# Patient Record
Sex: FEMALE | Race: BLACK OR AFRICAN AMERICAN | NOT HISPANIC OR LATINO | URBAN - METROPOLITAN AREA
[De-identification: names, ages, dates, MRNs, and addresses within clinical notes are randomized per-mention and may not be internally consistent; named-entity substitution may affect disease eponyms.]

---

## 2017-09-24 ENCOUNTER — EMERGENCY (EMERGENCY)
Facility: HOSPITAL | Age: 77
LOS: 0 days | Discharge: HOME | End: 2017-09-24

## 2017-09-24 DIAGNOSIS — T16.2XXA FOREIGN BODY IN LEFT EAR, INITIAL ENCOUNTER: ICD-10-CM

## 2017-09-24 DIAGNOSIS — I10 ESSENTIAL (PRIMARY) HYPERTENSION: ICD-10-CM
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
## 2017-09-26 PROBLEM — Z00.00 ENCOUNTER FOR PREVENTIVE HEALTH EXAMINATION: Status: ACTIVE | Noted: 2017-09-26

## 2017-10-16 ENCOUNTER — APPOINTMENT (OUTPATIENT)
Dept: OTOLARYNGOLOGY | Facility: CLINIC | Age: 77
End: 2017-10-16

## 2017-11-09 ENCOUNTER — APPOINTMENT (OUTPATIENT)
Dept: OTOLARYNGOLOGY | Facility: CLINIC | Age: 77
End: 2017-11-09

## 2017-11-17 ENCOUNTER — APPOINTMENT (OUTPATIENT)
Dept: OTOLARYNGOLOGY | Facility: CLINIC | Age: 77
End: 2017-11-17

## 2018-02-02 ENCOUNTER — APPOINTMENT (OUTPATIENT)
Dept: OTOLARYNGOLOGY | Facility: CLINIC | Age: 78
End: 2018-02-02

## 2018-05-30 ENCOUNTER — APPOINTMENT (OUTPATIENT)
Dept: UROLOGY | Facility: CLINIC | Age: 78
End: 2018-05-30
Payer: MEDICARE

## 2018-05-30 VITALS
HEIGHT: 61 IN | SYSTOLIC BLOOD PRESSURE: 128 MMHG | WEIGHT: 148 LBS | BODY MASS INDEX: 27.94 KG/M2 | HEART RATE: 65 BPM | DIASTOLIC BLOOD PRESSURE: 71 MMHG

## 2018-05-30 DIAGNOSIS — Z87.891 PERSONAL HISTORY OF NICOTINE DEPENDENCE: ICD-10-CM

## 2018-05-30 DIAGNOSIS — R31.29 OTHER MICROSCOPIC HEMATURIA: ICD-10-CM

## 2018-05-30 DIAGNOSIS — I10 ESSENTIAL (PRIMARY) HYPERTENSION: ICD-10-CM

## 2018-05-30 DIAGNOSIS — Z78.9 OTHER SPECIFIED HEALTH STATUS: ICD-10-CM

## 2018-05-30 DIAGNOSIS — E78.00 PURE HYPERCHOLESTEROLEMIA, UNSPECIFIED: ICD-10-CM

## 2018-05-30 DIAGNOSIS — R31.9 HEMATURIA, UNSPECIFIED: ICD-10-CM

## 2018-05-30 PROCEDURE — 99203 OFFICE O/P NEW LOW 30 MIN: CPT

## 2018-05-30 RX ORDER — AMLODIPINE BESYLATE 5 MG/1
5 TABLET ORAL
Qty: 90 | Refills: 0 | Status: ACTIVE | COMMUNITY
Start: 2018-01-22

## 2018-12-05 ENCOUNTER — APPOINTMENT (OUTPATIENT)
Dept: UROLOGY | Facility: CLINIC | Age: 78
End: 2018-12-05

## 2019-12-09 ENCOUNTER — RESULT REVIEW (OUTPATIENT)
Age: 79
End: 2019-12-09

## 2020-09-30 ENCOUNTER — INPATIENT (INPATIENT)
Facility: HOSPITAL | Age: 80
LOS: 2 days | Discharge: HOME | End: 2020-10-03
Attending: HOSPITALIST | Admitting: HOSPITALIST
Payer: MEDICARE

## 2020-09-30 VITALS
DIASTOLIC BLOOD PRESSURE: 79 MMHG | OXYGEN SATURATION: 98 % | SYSTOLIC BLOOD PRESSURE: 133 MMHG | WEIGHT: 149.91 LBS | TEMPERATURE: 98 F | HEART RATE: 82 BPM | RESPIRATION RATE: 19 BRPM

## 2020-09-30 DIAGNOSIS — E78.00 PURE HYPERCHOLESTEROLEMIA, UNSPECIFIED: ICD-10-CM

## 2020-09-30 DIAGNOSIS — I10 ESSENTIAL (PRIMARY) HYPERTENSION: ICD-10-CM

## 2020-09-30 DIAGNOSIS — G45.9 TRANSIENT CEREBRAL ISCHEMIC ATTACK, UNSPECIFIED: ICD-10-CM

## 2020-09-30 LAB
ALBUMIN SERPL ELPH-MCNC: 4.2 G/DL — SIGNIFICANT CHANGE UP (ref 3.5–5.2)
ALP SERPL-CCNC: 72 U/L — SIGNIFICANT CHANGE UP (ref 30–115)
ALT FLD-CCNC: 19 U/L — SIGNIFICANT CHANGE UP (ref 0–41)
ANION GAP SERPL CALC-SCNC: 11 MMOL/L — SIGNIFICANT CHANGE UP (ref 7–14)
APTT BLD: 30.7 SEC — SIGNIFICANT CHANGE UP (ref 27–39.2)
AST SERPL-CCNC: 40 U/L — SIGNIFICANT CHANGE UP (ref 0–41)
BASE EXCESS BLDV CALC-SCNC: -1.4 MMOL/L — SIGNIFICANT CHANGE UP (ref -2–2)
BASOPHILS # BLD AUTO: 0.05 K/UL — SIGNIFICANT CHANGE UP (ref 0–0.2)
BASOPHILS NFR BLD AUTO: 0.4 % — SIGNIFICANT CHANGE UP (ref 0–1)
BILIRUB SERPL-MCNC: <0.2 MG/DL — SIGNIFICANT CHANGE UP (ref 0.2–1.2)
BUN SERPL-MCNC: 34 MG/DL — HIGH (ref 10–20)
CA-I SERPL-SCNC: 1.29 MMOL/L — SIGNIFICANT CHANGE UP (ref 1.12–1.3)
CALCIUM SERPL-MCNC: 10.4 MG/DL — HIGH (ref 8.5–10.1)
CHLORIDE SERPL-SCNC: 102 MMOL/L — SIGNIFICANT CHANGE UP (ref 98–110)
CO2 SERPL-SCNC: 22 MMOL/L — SIGNIFICANT CHANGE UP (ref 17–32)
CREAT SERPL-MCNC: 1.9 MG/DL — HIGH (ref 0.7–1.5)
EOSINOPHIL # BLD AUTO: 0.11 K/UL — SIGNIFICANT CHANGE UP (ref 0–0.7)
EOSINOPHIL NFR BLD AUTO: 0.9 % — SIGNIFICANT CHANGE UP (ref 0–8)
ERYTHROCYTE [SEDIMENTATION RATE] IN BLOOD: 38 MM/HR — HIGH (ref 0–20)
GAS PNL BLDV: 138 MMOL/L — SIGNIFICANT CHANGE UP (ref 136–145)
GAS PNL BLDV: SIGNIFICANT CHANGE UP
GLUCOSE SERPL-MCNC: 111 MG/DL — HIGH (ref 70–99)
HCO3 BLDV-SCNC: 24 MMOL/L — SIGNIFICANT CHANGE UP (ref 22–29)
HCT VFR BLD CALC: 35.7 % — LOW (ref 37–47)
HCT VFR BLDA CALC: 36 % — SIGNIFICANT CHANGE UP (ref 34–44)
HGB BLD CALC-MCNC: 11.7 G/DL — LOW (ref 14–18)
HGB BLD-MCNC: 11.4 G/DL — LOW (ref 12–16)
HOROWITZ INDEX BLDV+IHG-RTO: 21 — SIGNIFICANT CHANGE UP
IMM GRANULOCYTES NFR BLD AUTO: 0.2 % — SIGNIFICANT CHANGE UP (ref 0.1–0.3)
INR BLD: 1.05 RATIO — SIGNIFICANT CHANGE UP (ref 0.65–1.3)
LACTATE BLDV-MCNC: 0.7 MMOL/L — SIGNIFICANT CHANGE UP (ref 0.5–1.6)
LYMPHOCYTES # BLD AUTO: 3.77 K/UL — HIGH (ref 1.2–3.4)
LYMPHOCYTES # BLD AUTO: 30.6 % — SIGNIFICANT CHANGE UP (ref 20.5–51.1)
MCHC RBC-ENTMCNC: 26.5 PG — LOW (ref 27–31)
MCHC RBC-ENTMCNC: 31.9 G/DL — LOW (ref 32–37)
MCV RBC AUTO: 83 FL — SIGNIFICANT CHANGE UP (ref 81–99)
MONOCYTES # BLD AUTO: 0.94 K/UL — HIGH (ref 0.1–0.6)
MONOCYTES NFR BLD AUTO: 7.6 % — SIGNIFICANT CHANGE UP (ref 1.7–9.3)
NEUTROPHILS # BLD AUTO: 7.42 K/UL — HIGH (ref 1.4–6.5)
NEUTROPHILS NFR BLD AUTO: 60.3 % — SIGNIFICANT CHANGE UP (ref 42.2–75.2)
NRBC # BLD: 0 /100 WBCS — SIGNIFICANT CHANGE UP (ref 0–0)
PCO2 BLDV: 40 MMHG — LOW (ref 41–51)
PH BLDV: 7.38 — SIGNIFICANT CHANGE UP (ref 7.26–7.43)
PLATELET # BLD AUTO: 346 K/UL — SIGNIFICANT CHANGE UP (ref 130–400)
PO2 BLDV: 30 MMHG — SIGNIFICANT CHANGE UP (ref 20–40)
POTASSIUM BLDV-SCNC: 4.4 MMOL/L — SIGNIFICANT CHANGE UP (ref 3.3–5.6)
POTASSIUM SERPL-MCNC: 5.6 MMOL/L — HIGH (ref 3.5–5)
POTASSIUM SERPL-SCNC: 5.6 MMOL/L — HIGH (ref 3.5–5)
PROT SERPL-MCNC: 7.8 G/DL — SIGNIFICANT CHANGE UP (ref 6–8)
PROTHROM AB SERPL-ACNC: 12.1 SEC — SIGNIFICANT CHANGE UP (ref 9.95–12.87)
RAPID RVP RESULT: SIGNIFICANT CHANGE UP
RBC # BLD: 4.3 M/UL — SIGNIFICANT CHANGE UP (ref 4.2–5.4)
RBC # FLD: 14.3 % — SIGNIFICANT CHANGE UP (ref 11.5–14.5)
SAO2 % BLDV: 51 % — SIGNIFICANT CHANGE UP
SARS-COV-2 RNA SPEC QL NAA+PROBE: SIGNIFICANT CHANGE UP
SODIUM SERPL-SCNC: 135 MMOL/L — SIGNIFICANT CHANGE UP (ref 135–146)
TROPONIN T SERPL-MCNC: <0.01 NG/ML — SIGNIFICANT CHANGE UP
WBC # BLD: 12.32 K/UL — HIGH (ref 4.8–10.8)
WBC # FLD AUTO: 12.32 K/UL — HIGH (ref 4.8–10.8)

## 2020-09-30 PROCEDURE — 99222 1ST HOSP IP/OBS MODERATE 55: CPT

## 2020-09-30 PROCEDURE — 99291 CRITICAL CARE FIRST HOUR: CPT

## 2020-09-30 PROCEDURE — 70450 CT HEAD/BRAIN W/O DYE: CPT | Mod: 26

## 2020-09-30 RX ORDER — PANTOPRAZOLE SODIUM 20 MG/1
40 TABLET, DELAYED RELEASE ORAL
Refills: 0 | Status: DISCONTINUED | OUTPATIENT
Start: 2020-09-30 | End: 2020-10-03

## 2020-09-30 RX ORDER — ASPIRIN/CALCIUM CARB/MAGNESIUM 324 MG
81 TABLET ORAL DAILY
Refills: 0 | Status: DISCONTINUED | OUTPATIENT
Start: 2020-09-30 | End: 2020-09-30

## 2020-09-30 RX ORDER — SIMVASTATIN 20 MG/1
40 TABLET, FILM COATED ORAL AT BEDTIME
Refills: 0 | Status: DISCONTINUED | OUTPATIENT
Start: 2020-10-01 | End: 2020-10-01

## 2020-09-30 RX ORDER — CHLORHEXIDINE GLUCONATE 213 G/1000ML
1 SOLUTION TOPICAL
Refills: 0 | Status: DISCONTINUED | OUTPATIENT
Start: 2020-09-30 | End: 2020-10-03

## 2020-09-30 RX ORDER — ASPIRIN/CALCIUM CARB/MAGNESIUM 324 MG
81 TABLET ORAL DAILY
Refills: 0 | Status: DISCONTINUED | OUTPATIENT
Start: 2020-09-30 | End: 2020-10-03

## 2020-09-30 RX ORDER — ASPIRIN/CALCIUM CARB/MAGNESIUM 324 MG
325 TABLET ORAL ONCE
Refills: 0 | Status: COMPLETED | OUTPATIENT
Start: 2020-09-30 | End: 2020-09-30

## 2020-09-30 RX ORDER — AMLODIPINE BESYLATE 2.5 MG/1
5 TABLET ORAL DAILY
Refills: 0 | Status: DISCONTINUED | OUTPATIENT
Start: 2020-09-30 | End: 2020-10-03

## 2020-09-30 RX ADMIN — Medication 325 MILLIGRAM(S): at 22:17

## 2020-09-30 NOTE — ED PROVIDER NOTE - PROGRESS NOTE DETAILS
MARCOS: NIH remains 0. Discussed with dr. Henderson recommends q4 neuro checks and due to elevated creatinine no CTA H+N, but MRI/MRA. also requests ESR/CRP which they will follow. disucssed with ICU Dr. Webster [facility]  to david pt. approved for CCU tele by Dr. Morgan.

## 2020-09-30 NOTE — ED PROVIDER NOTE - OBJECTIVE STATEMENT
78 y/o F with PMH HTN, HLD, cataracts/glaucoma presents with 1 episode of constant mild R eye loss of vision x 3-5 minutes earlier today. no pain. she describes it as "a curtain came over my eye". no palliating/provoking factors. at baseline now.   no hx prior. no floaters. no hx CVA.   presented to her ophthalmologist who did complete eye workup which was normal and sent to ED for TIA workup.   no parasthesias/difficulty ambulating/weakness/HA/LOC/trauma.

## 2020-09-30 NOTE — ED PROVIDER NOTE - PHYSICAL EXAMINATION
PHYSICAL EXAM:    GENERAL: Alert, appears stated age, well appearing, non-toxic  SKIN: Warm, pink and dry. MMM.   HEAD: NC, AT, no step offs   EYE: Normal lids/conjunctiva, PERRL, EOMI  ENT: Normal hearing, patent oropharynx without  NECK: +supple. No meningismus, or JVD  Pulm: Bilateral BS, normal resp effort, no wheezes, stridor, or retractions  CV: RRR, no M/R/G, 2+and = radial pulses  Abd: soft, non-tender, non-distended  Mskel: no erythema, cyanosis, edema. no calf tenderness  Neuro: AAOx3, no sensory/motor deficits, CN 2-12 intact. No speech slurring, pronator drift, facial asymmetry. normal finger-to-nose b/l. 5/5 strength throughout. normal gait. negative romberg.

## 2020-09-30 NOTE — H&P ADULT - HISTORY OF PRESENT ILLNESS
Patient is a 79y old  Female who presents with a chief complaint of numbness/ weakness - 2 days    T(F): 98.4 (09-30-20 @ 20:14), Max: 98.4 (09-30-20 @ 20:14)  HR: 65 (09-30-20 @ 21:17)  BP: 108/61 (09-30-20 @ 21:17)  RR: 18 (09-30-20 @ 21:17)  SpO2: 100% (09-30-20 @ 21:17) (98% - 100%)    PHYSICAL EXAM:  GENERAL: NAD, well-groomed, well-developed  HEAD:  Atraumatic, Normocephalic  EYES: EOMI, PERRLA, conjunctiva and sclera clear  ENMT: No tonsillar erythema, exudates, or enlargement; Moist mucous membranes, Good dentition, No lesions  NECK: Supple, No JVD, Normal thyroid  NERVOUS SYSTEM:  Alert & Oriented X3, Good concentration; Motor Strength 5/5 B/L upper and lower extremities; DTRs 2+ intact and symmetric  CHEST/LUNG: Clear to percussion bilaterally; No rales, rhonchi, wheezing, or rubs  HEART: Regular rate and rhythm; No murmurs, rubs, or gallops  ABDOMEN: Soft, Nontender, Nondistended; Bowel sounds present  EXTREMITIES:  2+ Peripheral Pulses, No clubbing, cyanosis, or edema  LYMPH: No lymphadenopathy noted  SKIN: No rashes or lesions    labs  09-30    135  |  102  |  34<H>  ----------------------------<  111<H>  5.6<H>   |  22  |  1.9<H>    Ca    10.4<H>      30 Sep 2020 20:21    TPro  7.8  /  Alb  4.2  /  TBili  <0.2  /  DBili  x   /  AST  40  /  ALT  19  /  AlkPhos  72  09-30                          11.4   12.32 )-----------( 346      ( 30 Sep 2020 20:21 )             35.7         PT/INR - ( 30 Sep 2020 20:21 )   PT: 12.10 sec;   INR: 1.05 ratio         PTT - ( 30 Sep 2020 20:21 )  PTT:30.7 sec      radiology    aspirin 325 milliGRAM(s) Oral Once

## 2020-09-30 NOTE — ED PROVIDER NOTE - CLINICAL SUMMARY MEDICAL DECISION MAKING FREE TEXT BOX
78 yo F, history of HTN, HLD here for assessment of now resolved episode of acute, unilateral vision loss in R eye. Sx began acutely at 11am, resolved by 1pm. Saw ophthalmologist and had a complete, dilated optical exam without abnormalities and was sent to ED for TIA eval.    Patient has no other complaints at this time, other than mild generalized weakness.    No previous CVA, no recent illness, travel, trauma, CP, fever, chills.     VS normal. Patient looks well, has clear lungs, RRR, soft, NT, Nd abdomen, Awake, alert, oriented. CN II-XII intact, 5/5 strength at upper and lower extremities, no pronator drift, intact finger to nose, clear speech.    Patient had normal CT, unremarkable labs, EKG. Case discussed with Dr. Henderson, amorosis fugax suggestive of TIA. Will need admission to CCU tele for further work up, Q4 neuro checks.

## 2020-09-30 NOTE — ED PROVIDER NOTE - NS ED ROS FT
Review of Systems    Constitutional: (-) fever   Eyes/ENT: (+) vision changes  Cardiovascular: (-) chest pain, (-) syncope (-) palpitations  Respiratory: (-) cough, (-) shortness of breath  Gastrointestinal: (-) vomiting, (-) diarrhea (-)black/bloody stools (-) abdominal pain  Genitourinary:  (-) dysuria   Musculoskeletal: (-) neck pain, (-) back pain, (-) leg pain/swelling  Integumentary: (-) rash, (-) edema  Neurological: (-) headache, (-) confusion  Hematologic: (-) easy bruising   Psychiatric: (-) hallucinations  Allergic/Immunologic: (-) pruritus

## 2020-09-30 NOTE — ED ADULT TRIAGE NOTE - CHIEF COMPLAINT QUOTE
pt states she loss vision between 11 am -1pm wasn't sure the exact time. Her ophthalmologist  sent pt to ER to r/o TIA . Pt also states she has generalized weakness

## 2020-10-01 LAB
ALBUMIN SERPL ELPH-MCNC: 3.6 G/DL — SIGNIFICANT CHANGE UP (ref 3.5–5.2)
ALP SERPL-CCNC: 75 U/L — SIGNIFICANT CHANGE UP (ref 30–115)
ALT FLD-CCNC: 15 U/L — SIGNIFICANT CHANGE UP (ref 0–41)
ANION GAP SERPL CALC-SCNC: 9 MMOL/L — SIGNIFICANT CHANGE UP (ref 7–14)
APPEARANCE UR: CLEAR — SIGNIFICANT CHANGE UP
AST SERPL-CCNC: 20 U/L — SIGNIFICANT CHANGE UP (ref 0–41)
BACTERIA # UR AUTO: ABNORMAL
BILIRUB DIRECT SERPL-MCNC: <0.2 MG/DL — SIGNIFICANT CHANGE UP (ref 0–0.2)
BILIRUB INDIRECT FLD-MCNC: 0 MG/DL — SIGNIFICANT CHANGE UP (ref 0.2–1.2)
BILIRUB SERPL-MCNC: <0.2 MG/DL — SIGNIFICANT CHANGE UP (ref 0.2–1.2)
BILIRUB UR-MCNC: NEGATIVE — SIGNIFICANT CHANGE UP
BUN SERPL-MCNC: 37 MG/DL — HIGH (ref 10–20)
CALCIUM SERPL-MCNC: 9.7 MG/DL — SIGNIFICANT CHANGE UP (ref 8.5–10.1)
CHLORIDE SERPL-SCNC: 106 MMOL/L — SIGNIFICANT CHANGE UP (ref 98–110)
CHOLEST SERPL-MCNC: 146 MG/DL — SIGNIFICANT CHANGE UP (ref 100–200)
CO2 SERPL-SCNC: 23 MMOL/L — SIGNIFICANT CHANGE UP (ref 17–32)
COLOR SPEC: YELLOW — SIGNIFICANT CHANGE UP
CREAT ?TM UR-MCNC: 67 MG/DL — SIGNIFICANT CHANGE UP
CREAT SERPL-MCNC: 1.7 MG/DL — HIGH (ref 0.7–1.5)
CRP SERPL-MCNC: 0.14 MG/DL — SIGNIFICANT CHANGE UP (ref 0–0.4)
CRP SERPL-MCNC: 0.16 MG/DL — SIGNIFICANT CHANGE UP (ref 0–0.4)
DIFF PNL FLD: NEGATIVE — SIGNIFICANT CHANGE UP
EPI CELLS # UR: ABNORMAL /HPF
ERYTHROCYTE [SEDIMENTATION RATE] IN BLOOD: 40 MM/HR — HIGH (ref 0–20)
GLUCOSE SERPL-MCNC: 107 MG/DL — HIGH (ref 70–99)
GLUCOSE UR QL: NEGATIVE MG/DL — SIGNIFICANT CHANGE UP
HCT VFR BLD CALC: 32.1 % — LOW (ref 37–47)
HDLC SERPL-MCNC: 44 MG/DL — LOW
HGB BLD-MCNC: 10.3 G/DL — LOW (ref 12–16)
KETONES UR-MCNC: NEGATIVE — SIGNIFICANT CHANGE UP
LEUKOCYTE ESTERASE UR-ACNC: ABNORMAL
LIPID PNL WITH DIRECT LDL SERPL: 84 MG/DL — SIGNIFICANT CHANGE UP (ref 4–129)
MCHC RBC-ENTMCNC: 26.7 PG — LOW (ref 27–31)
MCHC RBC-ENTMCNC: 32.1 G/DL — SIGNIFICANT CHANGE UP (ref 32–37)
MCV RBC AUTO: 83.2 FL — SIGNIFICANT CHANGE UP (ref 81–99)
NITRITE UR-MCNC: NEGATIVE — SIGNIFICANT CHANGE UP
NRBC # BLD: 0 /100 WBCS — SIGNIFICANT CHANGE UP (ref 0–0)
PH UR: 6 — SIGNIFICANT CHANGE UP (ref 5–8)
PLATELET # BLD AUTO: 358 K/UL — SIGNIFICANT CHANGE UP (ref 130–400)
POTASSIUM SERPL-MCNC: 4.8 MMOL/L — SIGNIFICANT CHANGE UP (ref 3.5–5)
POTASSIUM SERPL-SCNC: 4.8 MMOL/L — SIGNIFICANT CHANGE UP (ref 3.5–5)
PROT SERPL-MCNC: 6.5 G/DL — SIGNIFICANT CHANGE UP (ref 6–8)
PROT UR-MCNC: NEGATIVE MG/DL — SIGNIFICANT CHANGE UP
RBC # BLD: 3.86 M/UL — LOW (ref 4.2–5.4)
RBC # FLD: 14.3 % — SIGNIFICANT CHANGE UP (ref 11.5–14.5)
RBC CASTS # UR COMP ASSIST: SIGNIFICANT CHANGE UP /HPF
SODIUM SERPL-SCNC: 138 MMOL/L — SIGNIFICANT CHANGE UP (ref 135–146)
SODIUM UR-SCNC: 84 MMOL/L — SIGNIFICANT CHANGE UP
SP GR SPEC: 1.02 — SIGNIFICANT CHANGE UP (ref 1.01–1.03)
TOTAL CHOLESTEROL/HDL RATIO MEASUREMENT: 3.3 RATIO — LOW (ref 4–5.5)
TRIGL SERPL-MCNC: 114 MG/DL — SIGNIFICANT CHANGE UP (ref 10–149)
UROBILINOGEN FLD QL: 0.2 MG/DL — SIGNIFICANT CHANGE UP (ref 0.2–0.2)
WBC # BLD: 10.76 K/UL — SIGNIFICANT CHANGE UP (ref 4.8–10.8)
WBC # FLD AUTO: 10.76 K/UL — SIGNIFICANT CHANGE UP (ref 4.8–10.8)
WBC UR QL: ABNORMAL /HPF

## 2020-10-01 PROCEDURE — ZZZZZ: CPT

## 2020-10-01 PROCEDURE — 99222 1ST HOSP IP/OBS MODERATE 55: CPT

## 2020-10-01 PROCEDURE — 99233 SBSQ HOSP IP/OBS HIGH 50: CPT

## 2020-10-01 PROCEDURE — 93880 EXTRACRANIAL BILAT STUDY: CPT | Mod: 26

## 2020-10-01 RX ORDER — ATORVASTATIN CALCIUM 80 MG/1
40 TABLET, FILM COATED ORAL AT BEDTIME
Refills: 0 | Status: DISCONTINUED | OUTPATIENT
Start: 2020-10-01 | End: 2020-10-03

## 2020-10-01 RX ORDER — SODIUM CHLORIDE 9 MG/ML
1000 INJECTION INTRAMUSCULAR; INTRAVENOUS; SUBCUTANEOUS
Refills: 0 | Status: DISCONTINUED | OUTPATIENT
Start: 2020-10-01 | End: 2020-10-02

## 2020-10-01 RX ORDER — HEPARIN SODIUM 5000 [USP'U]/ML
5000 INJECTION INTRAVENOUS; SUBCUTANEOUS EVERY 8 HOURS
Refills: 0 | Status: DISCONTINUED | OUTPATIENT
Start: 2020-10-01 | End: 2020-10-03

## 2020-10-01 RX ADMIN — HEPARIN SODIUM 5000 UNIT(S): 5000 INJECTION INTRAVENOUS; SUBCUTANEOUS at 21:35

## 2020-10-01 RX ADMIN — AMLODIPINE BESYLATE 5 MILLIGRAM(S): 2.5 TABLET ORAL at 05:57

## 2020-10-01 RX ADMIN — ATORVASTATIN CALCIUM 40 MILLIGRAM(S): 80 TABLET, FILM COATED ORAL at 21:35

## 2020-10-01 RX ADMIN — CHLORHEXIDINE GLUCONATE 1 APPLICATION(S): 213 SOLUTION TOPICAL at 05:57

## 2020-10-01 RX ADMIN — Medication 81 MILLIGRAM(S): at 11:58

## 2020-10-01 RX ADMIN — HEPARIN SODIUM 5000 UNIT(S): 5000 INJECTION INTRAVENOUS; SUBCUTANEOUS at 05:57

## 2020-10-01 RX ADMIN — HEPARIN SODIUM 5000 UNIT(S): 5000 INJECTION INTRAVENOUS; SUBCUTANEOUS at 13:31

## 2020-10-01 RX ADMIN — SODIUM CHLORIDE 50 MILLILITER(S): 9 INJECTION INTRAMUSCULAR; INTRAVENOUS; SUBCUTANEOUS at 13:31

## 2020-10-01 RX ADMIN — PANTOPRAZOLE SODIUM 40 MILLIGRAM(S): 20 TABLET, DELAYED RELEASE ORAL at 05:57

## 2020-10-01 NOTE — CONSULT NOTE ADULT - ATTENDING COMMENTS
Attending Statement: I have personally performed a face to face diagnostic evaluation on this patient. The patient is suffering from CVA.   I have reviewed the above note and agree with the history, exam and suggestions for care, except as I have noted in the text.

## 2020-10-01 NOTE — PROGRESS NOTE ADULT - ASSESSMENT
A/P:    # amaurosis fugax / TIA / r/o CVA - CTH neg. symptoms resolved. LDL panel wnl. check A1c. f/u echo. f/u MR head. Carotid duplex. f/u neuro. Asa, statin.     # HTN - on Norvasc   # DVT PPx - Heparin 5000 mg SubQ    # Activity -  increase as Tolerated    # Dispo -   Patient to be discharged when medically optimized.  # Code Status - (X) FULL       A/P:    # amaurosis fugax / TIA / r/o CVA - CTH neg. symptoms resolved. LDL panel wnl. check A1c. f/u echo. f/u MR head. Carotid duplex. f/u neuro. Asa, statin.     # Oscar on CKD - cr 1.9 now 1.7 gfr  # HTN - on Norvasc   # DVT PPx - Heparin 5000 mg SubQ    # Activity -  increase as Tolerated    # Dispo -   Patient to be discharged when medically optimized.  # Code Status - (X) FULL       A/P:    # amaurosis fugax / TIA / r/o CVA   - CTH neg. symptoms resolved. LDL panel wnl. check A1c.  - f/u echo.  - f/u MR head, mra head and neck. Carotid duplex.  Asa, statin.     # Oscar on CKD - cr 1.9 now 1.7 gfr  # HTN - on Norvasc   # DVT PPx - Heparin 5000 mg SubQ    # Activity -  increase as Tolerated    # Dispo -   Patient to be discharged when medically optimized.  # Code Status - (X) FULL       A/P:    # amaurosis fugax / TIA / r/o CVA   - CTH neg. symptoms resolved. LDL panel wnl. check A1c.  - f/u echo.  - f/u MR head, mra head and neck. Carotid duplex.  Asa, statin.     # Oscar on CKD - cr 1.9 now 1.7 gfr. baseline 1.0. NS @ 50, trend cr in am, urine lytes  # HTN - on Norvasc   # DVT PPx - Heparin 5000 mg SubQ    # Activity -  increase as Tolerated    # Dispo -   Patient to be discharged when medically optimized.  # Code Status - (X) FULL

## 2020-10-01 NOTE — CONSULT NOTE ADULT - ASSESSMENT
IMPRESSION:  TIA   SARAVANAN on CKD    PLAN:    CNS: avoid CNS depressants. Neuro checks q4h. FU neuro. mri brain nc, mra head and neck    HEENT: Oral care    PULMONARY:  HOB @ 30 degrees.  Aspiration precautions. Target SpO2>94%.     CARDIOVASCULAR: ECHO. A1c. avoid volume overload    GI: GI prophylaxis.  Feeding.  Bowel regimen     RENAL:  Follow up lytes. Correct as needed. Trend Cr. Valarie, urine Cr, urine urea nitrogen. Start NS at 50 cc/hr.    INFECTIOUS DISEASE: No abx.     HEMATOLOGICAL:  DVT prophylaxis.    ENDOCRINE:  Follow up FS.    MUSCULOSKELETAL: PT/ OT eval.     Keep in tele     IMPRESSION:  TIA   SARAVANAN on CKD    SUGGEST:    CNS: avoid CNS depressants. Neuro checks q4h. FU neuro. mri brain nc, mra head and neck    HEENT: Oral care    PULMONARY:  HOB @ 30 degrees.  Aspiration precautions. Target SpO2>94%.     CARDIOVASCULAR: ECHO. A1c. avoid volume overload    GI: GI prophylaxis.  Feeding.  Bowel regimen     RENAL:  Follow up lytes. Correct as needed. Trend Cr. Valarie, urine Cr, urine urea nitrogen. Start NS at 50 cc/hr.    INFECTIOUS DISEASE: No abx.     HEMATOLOGICAL:  DVT prophylaxis.    ENDOCRINE:  Follow up FS.    MUSCULOSKELETAL: PT/ OT eval.     Keep in tele     IMPRESSION:  TIA   SARAVANAN (Cr baseline ~1)    SUGGEST:    CNS: avoid CNS depressants. Neuro checks q4h. FU neuro. mri brain nc, mra head and neck    HEENT: Oral care    PULMONARY:  HOB @ 30 degrees.  Aspiration precautions. Target SpO2>94%.     CARDIOVASCULAR: ECHO. A1c. avoid volume overload    GI: GI prophylaxis.  Feeding.  Bowel regimen.    RENAL:  Follow up lytes. Correct as needed. Trend Cr. Valarie, urine Cr, urine urea nitrogen. Start NS at 50 cc/hr.    INFECTIOUS DISEASE: No abx.     HEMATOLOGICAL:  DVT prophylaxis. Repeat ESR.     ENDOCRINE:  Follow up FS.    MUSCULOSKELETAL: PT/ OT eval.     Keep in tele

## 2020-10-01 NOTE — PHARMACOTHERAPY INTERVENTION NOTE - COMMENTS
Recommended to discontinue Simvastatin and start Lipitor 40 mg for high intensity statin Recommended to discontinue Simvastatin and start Lipitor 40 mg for high intensity statin. Lipitor would be preferred due to drug interaction between amlodipine and simvastatin- increase risk of rhabdomylosis

## 2020-10-01 NOTE — CONSULT NOTE ADULT - SUBJECTIVE AND OBJECTIVE BOX
Neurology Consultation note    Name  ALMAZ PRECIADO    HPI:   Patient is a 79y old  Female who presents with a chief complaint of numbness/ weakness - 2 days as well as 1/2 hr transient visual loss OD.  she is at her baseline this am. no hx of cva  does not take asa at home  no new complaints or other focality    T(F): 98.4 (09-30-20 @ 20:14), Max: 98.4 (09-30-20 @ 20:14)  HR: 65 (09-30-20 @ 21:17)  BP: 108/61 (09-30-20 @ 21:17)  RR: 18 (09-30-20 @ 21:17)  SpO2: 100% (09-30-20 @ 21:17) (98% - 100%)    PHYSICAL EXAM:  GENERAL: NAD, well-groomed, well-developed  HEAD:  Atraumatic, Normocephalic  EYES: EOMI, PERRLA, conjunctiva and sclera clear  ENMT: No tonsillar erythema, exudates, or enlargement; Moist mucous membranes, Good dentition, No lesions  NECK: Supple, No JVD, Normal thyroid  NERVOUS SYSTEM:  Alert & Oriented X3, Good concentration; Motor Strength 5/5 B/L upper and lower extremities; DTRs 2+ intact and symmetric  CHEST/LUNG: Clear to percussion bilaterally; No rales, rhonchi, wheezing, or rubs  HEART: Regular rate and rhythm; No murmurs, rubs, or gallops  ABDOMEN: Soft, Nontender, Nondistended; Bowel sounds present  EXTREMITIES:  2+ Peripheral Pulses, No clubbing, cyanosis, or edema  LYMPH: No lymphadenopathy noted  SKIN: No rashes or lesions    labs  09-30    135  |  102  |  34<H>  ----------------------------<  111<H>  5.6<H>   |  22  |  1.9<H>    Ca    10.4<H>      30 Sep 2020 20:21    TPro  7.8  /  Alb  4.2  /  TBili  <0.2  /  DBili  x   /  AST  40  /  ALT  19  /  AlkPhos  72  09-30                          11.4   12.32 )-----------( 346      ( 30 Sep 2020 20:21 )             35.7         PT/INR - ( 30 Sep 2020 20:21 )   PT: 12.10 sec;   INR: 1.05 ratio         PTT - ( 30 Sep 2020 20:21 )  PTT:30.7 sec      radiology    aspirin 325 milliGRAM(s) Oral Once   (30 Sep 2020 22:14)      Interval History:        Vital Signs Last 24 Hrs  T(C): 36.2 (01 Oct 2020 07:01), Max: 36.9 (30 Sep 2020 20:14)  T(F): 97.1 (01 Oct 2020 07:01), Max: 98.4 (30 Sep 2020 20:14)  HR: 55 (01 Oct 2020 07:07) (55 - 82)  BP: 122/59 (01 Oct 2020 07:07) (105/53 - 155/62)  BP(mean): 85 (01 Oct 2020 07:07) (75 - 86)  RR: 36 (01 Oct 2020 07:07) (16 - 36)  SpO2: 100% (01 Oct 2020 07:07) (98% - 100%)    Neurological Exam:   Mental status: Awake, alert and oriented x3.  Recent and remote memory intact.  Naming, repetition and comprehension intact.  Attention/concentration intact.  No dysarthria, no aphasia.  Fund of knowledge appropriate.    Cranial nerves: Pupils equally round and reactive to light, visual fields full, no nystagmus, extraocular muscles intact, V1 through V3 intact bilaterally and symmetric, face symmetric, hearing intact to finger rub, palate elevation symmetric, tongue was midline.  Motor:  MRC grading 5/5 b/l UE/LE.   strength 5/5.  Normal tone and bulk.  No abnormal movements.    Sensation: Intact to light touch, proprioception, and pinprick.   Coordination: No dysmetria on finger-to-nose and heel-to-shin.  No dysdiadokinesia.  Reflexes: 2+ in bilateral UE/LE, downgoing toes bilaterally. (-) Juárez.  Gait: Narrow and steady. No ataxia.  Romberg negative    Medications  amLODIPine   Tablet 5 milliGRAM(s) Oral daily  aspirin enteric coated 81 milliGRAM(s) Oral daily  chlorhexidine 4% Liquid 1 Application(s) Topical <User Schedule>  heparin   Injectable 5000 Unit(s) SubCutaneous every 8 hours  pantoprazole    Tablet 40 milliGRAM(s) Oral before breakfast  simvastatin 40 milliGRAM(s) Oral at bedtime      Lab  10-01    138  |  106  |  37<H>  ----------------------------<  107<H>  4.8   |  23  |  1.7<H>    Ca    9.7      01 Oct 2020 05:42    TPro  6.5  /  Alb  3.6  /  TBili  <0.2  /  DBili  <0.2  /  AST  20  /  ALT  15  /  AlkPhos  75  10-01                          10.3   10.76 )-----------( 358      ( 01 Oct 2020 05:42 )             32.1     LIVER FUNCTIONS - ( 01 Oct 2020 05:42 )  Alb: 3.6 g/dL / Pro: 6.5 g/dL / ALK PHOS: 75 U/L / ALT: 15 U/L / AST: 20 U/L / GGT: x                   Radiology  CT Head No Cont:   EXAM:  CT BRAIN            PROCEDURE DATE:  09/30/2020            INTERPRETATION:  Clinical History / Reason for exam: Vision changes.    Technique: CT head without IV contrast performed.  Contiguous unenhanced CT axial images of the head from the base to the vertex with coronal and sagittal reformats.    Comparison: None.    Findings:    The ventricles and cortical sulci are normal in size and configuration.   There is no acute intracranial hemorrhage, extra-axial fluid collection or midline shift.  Gray-white matter differentiation is maintained.    The visualized paranasal sinuses and mastoids are well-aerated. No depressed calvarial fracture.    IMPRESSION:    No evidence of acute intracranial pathology.                NADIA ESPARZA M.D.,ATTENDING RADIOLOGIST  This document has been electronically signed. Sep 30 2020  9:06PM (09-30-20 @ 20:50)      Assessment:  80 yo female with hx of htn and cc of generalized weakness and transient vision loss OD described as blacking out of her vision  normal exam now  no hx of cva  adm for tia w/u  Plan:    cont neuro checks q 4 hrs  cont asa 81 mg   add lipitor 80 mg qd  check lipids and hba1c  mri brain nc  mra head and neck  2d echo

## 2020-10-01 NOTE — PROGRESS NOTE ADULT - SUBJECTIVE AND OBJECTIVE BOX
DAILY PROGRESS NOTE  ===========================================================  Patient Information:   Hospital Day:</ALMAZ OSAKWE  /  79y  /  Female  /b>  /  MRN#: [id number]  Working / Admitting Diagnosis:  1. TIA    |:::::::::::::::::::::::::::::| SUBJECTIVE |:::::::::::::::::::::::::::::::|  OVERNIGHT EVENTS:   No acute events noted. symptoms completey resolved, was dizziness intially than lost vision breifly while inserting eye drops? now all WNL  Denies chest pain / SOB / palpitations / Nausea / Vomitting / constipation / diarrhea or abdominal pain.   ROS otherwise negative.  Past Medical History:   TIA(TRANSIENT ISCHEMIC ATTACK)    ^LOSS OF VISION IN RT EYE    MEWS Score    Hypertension    High blood cholesterol    TIA (transient ischemic attack)    High blood cholesterol    Hypertension    TIA (transient ischemic attack)    LOSS OF VISION IN RT EYE    90+    [email or web address]      ALLERGIES:  No Known Allergies        |:::::::::::::::::::::::::::| OBJECTIVE |:::::::::::::::::::::::::::|    VITAL SIGNS: Last 24 Hours  T(C): 36.2 (01 Oct 2020 07:01), Max: 36.9 (30 Sep 2020 20:14)  T(F): 97.1 (01 Oct 2020 07:01), Max: 98.4 (30 Sep 2020 20:14)  HR: 55 (01 Oct 2020 07:07) (55 - 82)  BP: 122/59 (01 Oct 2020 07:07) (105/53 - 155/62)  BP(mean): 85 (01 Oct 2020 07:07) (75 - 86)  RR: 36 (01 Oct 2020 07:07) (16 - 36)  SpO2: 100% (01 Oct 2020 07:07) (98% - 100%)    20 @ 07:01  -  10-01-20 @ 07:00  --------------------------------------------------------  IN: 240 mL / OUT: 400 mL / NET: -160 mL      PHYSICAL EXAM:  GENERAL:   Awake, alert; NAD.  HEENT:  Head NC/AT; Conjunctivae pink, Sclera anicteric & non-injected; Oral mucosa moist.  NECK:   Supple.  CARDIO:   RRR; S1 & S2 audible  RESP:  No respiratory distress or accessory muscle use. CTAB  GI:   Soft/ NT/ND / No guarding; No rebound tenderness.  EXT:   Without any cyanosis, clubbing, rash, lesions or edema.     LAB RESULTS:                        10.3   10.76 )-----------( 358      ( 01 Oct 2020 05:42 )             32.1     10    138  |  106  |  37<H>  ----------------------------<  107<H>  4.8   |  23  |  1.7<H>    Ca    9.7      01 Oct 2020 05:42    TPro  6.5  /  Alb  3.6  /  TBili  <0.2  /  DBili  <0.2  /  AST  20  /  ALT  15  /  AlkPhos  75  10-01    PT/INR - ( 30 Sep 2020 20:21 )   PT: 12.10 sec;   INR: 1.05 ratio         PTT - ( 30 Sep 2020 20:21 )  PTT:30.7 sec  Urinalysis Basic - ( 01 Oct 2020 08:20 )    Color: Yellow / Appearance: Clear / S.025 / pH: x  Gluc: x / Ketone: Negative  / Bili: Negative / Urobili: 0.2 mg/dL   Blood: x / Protein: Negative mg/dL / Nitrite: Negative   Leuk Esterase: Small / RBC: x / WBC x   Sq Epi: x / Non Sq Epi: x / Bacteria: x    Sedimentation Rate, Erythrocyte: 38 mm/Hr <H> (20 @ 21:30)  Troponin T, Serum: <0.01 ng/mL (20 @ 20:21)    CARDIAC MARKERS ( 30 Sep 2020 20:21 )  x     / <0.01 ng/mL / x     / x     / x        RADIOLOGY:    < from: CT Head No Cont (20 @ 20:50) >  IMPRESSION:    No evidence of acute intracranial pathology.        < end of copied text >      INPATIENT MEDICATIONS:  amLODIPine   Tablet 5 milliGRAM(s) Oral daily  aspirin enteric coated 81 milliGRAM(s) Oral daily  chlorhexidine 4% Liquid 1 Application(s) Topical <User Schedule>  heparin   Injectable 5000 Unit(s) SubCutaneous every 8 hours  pantoprazole    Tablet 40 milliGRAM(s) Oral before breakfast  simvastatin 40 milliGRAM(s) Oral at bedtime    PRN MEDICATIONS    ------------------------------------------------------------------------------------------------------------

## 2020-10-01 NOTE — CONSULT NOTE ADULT - SUBJECTIVE AND OBJECTIVE BOX
Patient is a 79y old  Female who presents with a chief complaint of CVA/ TIA (01 Oct 2020 09:12)      HPI:   Patient is a 79y old  Female who presents with a chief complaint of numbness/ weakness - 2 days    T(F): 98.4 (20 @ 20:14), Max: 98.4 (20 @ 20:14)  HR: 65 (20 @ 21:17)  BP: 108/61 (20 @ 21:17)  RR: 18 (20 @ 21:17)  SpO2: 100% (20 @ 21:17) (98% - 100%)    PHYSICAL EXAM:  GENERAL: NAD, well-groomed, well-developed  HEAD:  Atraumatic, Normocephalic  EYES: EOMI, PERRLA, conjunctiva and sclera clear  ENMT: No tonsillar erythema, exudates, or enlargement; Moist mucous membranes, Good dentition, No lesions  NECK: Supple, No JVD, Normal thyroid  NERVOUS SYSTEM:  Alert & Oriented X3, Good concentration; Motor Strength 5/5 B/L upper and lower extremities; DTRs 2+ intact and symmetric  CHEST/LUNG: Clear to percussion bilaterally; No rales, rhonchi, wheezing, or rubs  HEART: Regular rate and rhythm; No murmurs, rubs, or gallops  ABDOMEN: Soft, Nontender, Nondistended; Bowel sounds present  EXTREMITIES:  2+ Peripheral Pulses, No clubbing, cyanosis, or edema  LYMPH: No lymphadenopathy noted  SKIN: No rashes or lesions    labs      135  |  102  |  34<H>  ----------------------------<  111<H>  5.6<H>   |  22  |  1.9<H>    Ca    10.4<H>      30 Sep 2020 20:21    TPro  7.8  /  Alb  4.2  /  TBili  <0.2  /  DBili  x   /  AST  40  /  ALT  19  /  AlkPhos  72                            11.4   12.32 )-----------( 346      ( 30 Sep 2020 20:21 )             35.7         PT/INR - ( 30 Sep 2020 20:21 )   PT: 12.10 sec;   INR: 1.05 ratio         PTT - ( 30 Sep 2020 20:21 )  PTT:30.7 sec      radiology    aspirin 325 milliGRAM(s) Oral Once   (30 Sep 2020 22:14)      PAST MEDICAL & SURGICAL HISTORY:  Hypertension    High blood cholesterol        SOCIAL HX:   Smoking     never smoker                    ETOH     denies                       Other denies illicit drug use, from home    FAMILY HISTORY:  :  No known cardiovascular family history     Review Of Systems:     All ROS are negative except per HPI       Allergies    No Known Allergies    Intolerances          PHYSICAL EXAM    ICU Vital Signs Last 24 Hrs  T(C): 36.2 (01 Oct 2020 07:01), Max: 36.9 (30 Sep 2020 20:14)  T(F): 97.1 (01 Oct 2020 07:01), Max: 98.4 (30 Sep 2020 20:14)  HR: 55 (01 Oct 2020 07:07) (55 - 82)  BP: 122/59 (01 Oct 2020 07:07) (105/53 - 155/62)  BP(mean): 85 (01 Oct 2020 07:07) (75 - 86)  ABP: --  ABP(mean): --  RR: 36 (01 Oct 2020 07:07) (16 - 36)  SpO2: 100% (01 Oct 2020 07:07) (98% - 100%)      CONSTITUTIONAL:  Well nourished.  NAD    ENT:   Airway patent,   Mouth with normal mucosa.   No thrush    EYES:   pupils equal,   round and reactive to light.    CARDIAC:   Normal rate,   Regular rhythm.    Heart sounds S1, S2.   No edema      Vascular:   normal systolic impulse  no bruits    RESPIRATORY:   No wheezing   Normal chest expansion  No use of accessory muscles    GASTROINTESTINAL:  Abdomen soft   Non-tender,   No guarding,   + BS    GENITOURINARY  normal genitalia for sex  no edema    MUSCULOSKELETAL:   Range of motion is not limited,  Nno clubbing, cyanosis    NEUROLOGICAL:   Alert and oriented   No motor or sensory deficits.  Pertinent DTRs normal    SKIN:   Skin normal color for race,   Warm and dry  No evidence of rash.    PSYCHIATRIC:   Normal mood and affect.   No apparent risk to self or others.    HEME LYMPH:   No cervical  lymphadenopathy.  No inguinal lymphadenopathy            20 @ 07:01  -  10-01-20 @ 07:00  --------------------------------------------------------  IN:    Oral Fluid: 240 mL  Total IN: 240 mL    OUT:    Voided (mL): 400 mL  Total OUT: 400 mL    Total NET: -160 mL          LABS:                          10.3   10.76 )-----------( 358      ( 01 Oct 2020 05:42 )             32.1                                               10    138  |  106  |  37<H>  ----------------------------<  107<H>  4.8   |  23  |  1.7<H>    Ca    9.7      01 Oct 2020 05:42    TPro  6.5  /  Alb  3.6  /  TBili  <0.2  /  DBili  <0.2  /  AST  20  /  ALT  15  /  AlkPhos  75  10-01      PT/INR - ( 30 Sep 2020 20:21 )   PT: 12.10 sec;   INR: 1.05 ratio         PTT - ( 30 Sep 2020 20:21 )  PTT:30.7 sec                                       Urinalysis Basic - ( 01 Oct 2020 08:20 )    Color: Yellow / Appearance: Clear / S.025 / pH: x  Gluc: x / Ketone: Negative  / Bili: Negative / Urobili: 0.2 mg/dL   Blood: x / Protein: Negative mg/dL / Nitrite: Negative   Leuk Esterase: Small / RBC: 1-2 /HPF / WBC 6-10 /HPF   Sq Epi: x / Non Sq Epi: Few /HPF / Bacteria: Few        CARDIAC MARKERS ( 30 Sep 2020 20:21 )  x     / <0.01 ng/mL / x     / x     / x                                                LIVER FUNCTIONS - ( 01 Oct 2020 05:42 )  Alb: 3.6 g/dL / Pro: 6.5 g/dL / ALK PHOS: 75 U/L / ALT: 15 U/L / AST: 20 U/L / GGT: x                                                                                                                                       X-Rays reviewed:                                                                                    ECHO    CXR interpreted by me:    MEDICATIONS  (STANDING):  amLODIPine   Tablet 5 milliGRAM(s) Oral daily  aspirin enteric coated 81 milliGRAM(s) Oral daily  chlorhexidine 4% Liquid 1 Application(s) Topical <User Schedule>  heparin   Injectable 5000 Unit(s) SubCutaneous every 8 hours  pantoprazole    Tablet 40 milliGRAM(s) Oral before breakfast  simvastatin 40 milliGRAM(s) Oral at bedtime    MEDICATIONS  (PRN):         Patient is a 79y old  Female who presents with a chief complaint of CVA/ TIA (01 Oct 2020 09:12)      HPI:   Patient is a 79y old  Female who presents with a chief complaint of numbness/ weakness - 2 days              PT/INR - ( 30 Sep 2020 20:21 )   PT: 12.10 sec;   INR: 1.05 ratio         PTT - ( 30 Sep 2020 20:21 )  PTT:30.7 sec      radiology    aspirin 325 milliGRAM(s) Oral Once   (30 Sep 2020 22:14)      PAST MEDICAL & SURGICAL HISTORY:  Hypertension    High blood cholesterol        SOCIAL HX:   Smoking     never smoker                    ETOH     denies                       Other denies illicit drug use, from home    FAMILY HISTORY:  :  No known cardiovascular family history     Review Of Systems:     All ROS are negative except per HPI       Allergies    No Known Allergies    Intolerances          PHYSICAL EXAM    ICU Vital Signs Last 24 Hrs  T(C): 36.2 (01 Oct 2020 07:01), Max: 36.9 (30 Sep 2020 20:14)  T(F): 97.1 (01 Oct 2020 07:01), Max: 98.4 (30 Sep 2020 20:14)  HR: 55 (01 Oct 2020 07:07) (55 - 82)  BP: 122/59 (01 Oct 2020 07:07) (105/53 - 155/62)  BP(mean): 85 (01 Oct 2020 07:07) (75 - 86)  ABP: --  ABP(mean): --  RR: 36 (01 Oct 2020 07:07) (16 - 36)  SpO2: 100% (01 Oct 2020 07:07) (98% - 100%)      CONSTITUTIONAL:  Well nourished.  NAD    ENT:   Airway patent,   Mouth with normal mucosa.   No thrush    EYES:   pupils equal,   round and reactive to light.    CARDIAC:   Normal rate,   Regular rhythm.    Heart sounds S1, S2.   No edema      Vascular:   normal systolic impulse  no bruits    RESPIRATORY:   No wheezing   Normal chest expansion  No use of accessory muscles    GASTROINTESTINAL:  Abdomen soft   Non-tender,   No guarding,   + BS    GENITOURINARY  normal genitalia for sex  no edema    MUSCULOSKELETAL:   Range of motion is not limited,  Nno clubbing, cyanosis    NEUROLOGICAL:   Alert and oriented   No motor or sensory deficits.  Pertinent DTRs normal    SKIN:   Skin normal color for race,   Warm and dry  No evidence of rash.    PSYCHIATRIC:   Normal mood and affect.   No apparent risk to self or others.    HEME LYMPH:   No cervical  lymphadenopathy.  No inguinal lymphadenopathy            20 @ 07:01  -  10-01-20 @ 07:00  --------------------------------------------------------  IN:    Oral Fluid: 240 mL  Total IN: 240 mL    OUT:    Voided (mL): 400 mL  Total OUT: 400 mL    Total NET: -160 mL          LABS:                          10.3   10.76 )-----------( 358      ( 01 Oct 2020 05:42 )             32.1                                               10    138  |  106  |  37<H>  ----------------------------<  107<H>  4.8   |  23  |  1.7<H>    Ca    9.7      01 Oct 2020 05:42    TPro  6.5  /  Alb  3.6  /  TBili  <0.2  /  DBili  <0.2  /  AST  20  /  ALT  15  /  AlkPhos  75  1001      PT/INR - ( 30 Sep 2020 20:21 )   PT: 12.10 sec;   INR: 1.05 ratio         PTT - ( 30 Sep 2020 20:21 )  PTT:30.7 sec                                       Urinalysis Basic - ( 01 Oct 2020 08:20 )    Color: Yellow / Appearance: Clear / S.025 / pH: x  Gluc: x / Ketone: Negative  / Bili: Negative / Urobili: 0.2 mg/dL   Blood: x / Protein: Negative mg/dL / Nitrite: Negative   Leuk Esterase: Small / RBC: 1-2 /HPF / WBC 6-10 /HPF   Sq Epi: x / Non Sq Epi: Few /HPF / Bacteria: Few        CARDIAC MARKERS ( 30 Sep 2020 20:21 )  x     / <0.01 ng/mL / x     / x     / x                                                LIVER FUNCTIONS - ( 01 Oct 2020 05:42 )  Alb: 3.6 g/dL / Pro: 6.5 g/dL / ALK PHOS: 75 U/L / ALT: 15 U/L / AST: 20 U/L / GGT: x                                                                                                                                       X-Rays reviewed:                                                                                    ECHO    CXR interpreted by me:    MEDICATIONS  (STANDING):  amLODIPine   Tablet 5 milliGRAM(s) Oral daily  aspirin enteric coated 81 milliGRAM(s) Oral daily  chlorhexidine 4% Liquid 1 Application(s) Topical <User Schedule>  heparin   Injectable 5000 Unit(s) SubCutaneous every 8 hours  pantoprazole    Tablet 40 milliGRAM(s) Oral before breakfast  simvastatin 40 milliGRAM(s) Oral at bedtime    MEDICATIONS  (PRN):         Patient is a 79y old  Female who presents with a chief complaint of CVA/ TIA (01 Oct 2020 09:12)      HPI:   Patient is a 79y old  Female who presents with a chief complaint of numbness/ weakness - 2 days              PT/INR - ( 30 Sep 2020 20:21 )   PT: 12.10 sec;   INR: 1.05 ratio         PTT - ( 30 Sep 2020 20:21 )  PTT:30.7 sec      radiology    aspirin 325 milliGRAM(s) Oral Once   (30 Sep 2020 22:14)      PAST MEDICAL & SURGICAL HISTORY:  Hypertension    High blood cholesterol        SOCIAL HX:   Smoking     never smoker                    ETOH     denies                       Other denies illicit drug use, from home    FAMILY HISTORY:  :  No known cardiovascular family history     Review Of Systems:     All ROS are negative except per HPI       Allergies    No Known Allergies    Intolerances          PHYSICAL EXAM    ICU Vital Signs Last 24 Hrs  T(C): 36.2 (01 Oct 2020 07:01), Max: 36.9 (30 Sep 2020 20:14)  T(F): 97.1 (01 Oct 2020 07:01), Max: 98.4 (30 Sep 2020 20:14)  HR: 55 (01 Oct 2020 07:07) (55 - 82)  BP: 122/59 (01 Oct 2020 07:07) (105/53 - 155/62)  BP(mean): 85 (01 Oct 2020 07:07) (75 - 86)  ABP: --  ABP(mean): --  RR: 36 (01 Oct 2020 07:07) (16 - 36)  SpO2: 100% (01 Oct 2020 07:07) (98% - 100%)      CONSTITUTIONAL:  Well nourished.  NAD    ENT:   Airway patent,   Mouth with normal mucosa.   No thrush    EYES:   pupils equal,   round and reactive to light.    CARDIAC:   Normal rate,   Regular rhythm.    Heart sounds S1, S2.   No edema      Vascular:   normal systolic impulse  no bruits    RESPIRATORY:   No wheezing   Normal chest expansion  No use of accessory muscles    GASTROINTESTINAL:  Abdomen soft   Non-tender,   No guarding,   + BS    MUSCULOSKELETAL:   Range of motion is not limited,  Nno clubbing, cyanosis    NEUROLOGICAL:   Alert and oriented   No motor or sensory deficits.  Pertinent DTRs normal    SKIN:   Skin normal color for race,   Warm and dry  No evidence of rash.    PSYCHIATRIC:   Normal mood and affect.   No apparent risk to self or others.    HEME LYMPH:   No cervical  lymphadenopathy.  No inguinal lymphadenopathy            20 @ 07:01  -  10-01-20 @ 07:00  --------------------------------------------------------  IN:    Oral Fluid: 240 mL  Total IN: 240 mL    OUT:    Voided (mL): 400 mL  Total OUT: 400 mL    Total NET: -160 mL          LABS:                          10.3   10.76 )-----------( 358      ( 01 Oct 2020 05:42 )             32.1                                               10-01    138  |  106  |  37<H>  ----------------------------<  107<H>  4.8   |  23  |  1.7<H>    Ca    9.7      01 Oct 2020 05:42    TPro  6.5  /  Alb  3.6  /  TBili  <0.2  /  DBili  <0.2  /  AST  20  /  ALT  15  /  AlkPhos  75  10-01      PT/INR - ( 30 Sep 2020 20:21 )   PT: 12.10 sec;   INR: 1.05 ratio         PTT - ( 30 Sep 2020 20:21 )  PTT:30.7 sec                                       Urinalysis Basic - ( 01 Oct 2020 08:20 )    Color: Yellow / Appearance: Clear / S.025 / pH: x  Gluc: x / Ketone: Negative  / Bili: Negative / Urobili: 0.2 mg/dL   Blood: x / Protein: Negative mg/dL / Nitrite: Negative   Leuk Esterase: Small / RBC: 1-2 /HPF / WBC 6-10 /HPF   Sq Epi: x / Non Sq Epi: Few /HPF / Bacteria: Few        CARDIAC MARKERS ( 30 Sep 2020 20:21 )  x     / <0.01 ng/mL / x     / x     / x                                                LIVER FUNCTIONS - ( 01 Oct 2020 05:42 )  Alb: 3.6 g/dL / Pro: 6.5 g/dL / ALK PHOS: 75 U/L / ALT: 15 U/L / AST: 20 U/L / GGT: x                                                                                                                                       X-Rays reviewed:                                                                                    ECHO    CXR interpreted by me:    MEDICATIONS  (STANDING):  amLODIPine   Tablet 5 milliGRAM(s) Oral daily  aspirin enteric coated 81 milliGRAM(s) Oral daily  chlorhexidine 4% Liquid 1 Application(s) Topical <User Schedule>  heparin   Injectable 5000 Unit(s) SubCutaneous every 8 hours  pantoprazole    Tablet 40 milliGRAM(s) Oral before breakfast  simvastatin 40 milliGRAM(s) Oral at bedtime    MEDICATIONS  (PRN):

## 2020-10-02 ENCOUNTER — TRANSCRIPTION ENCOUNTER (OUTPATIENT)
Age: 80
End: 2020-10-02

## 2020-10-02 LAB
A1C WITH ESTIMATED AVERAGE GLUCOSE RESULT: 6.1 % — HIGH (ref 4–5.6)
ANION GAP SERPL CALC-SCNC: 8 MMOL/L — SIGNIFICANT CHANGE UP (ref 7–14)
BUN SERPL-MCNC: 26 MG/DL — HIGH (ref 10–20)
CALCIUM SERPL-MCNC: 9.2 MG/DL — SIGNIFICANT CHANGE UP (ref 8.5–10.1)
CHLORIDE SERPL-SCNC: 108 MMOL/L — SIGNIFICANT CHANGE UP (ref 98–110)
CO2 SERPL-SCNC: 23 MMOL/L — SIGNIFICANT CHANGE UP (ref 17–32)
CREAT SERPL-MCNC: 1.2 MG/DL — SIGNIFICANT CHANGE UP (ref 0.7–1.5)
CULTURE RESULTS: SIGNIFICANT CHANGE UP
ESTIMATED AVERAGE GLUCOSE: 128 MG/DL — HIGH (ref 68–114)
GLUCOSE SERPL-MCNC: 103 MG/DL — HIGH (ref 70–99)
MAGNESIUM SERPL-MCNC: 2 MG/DL — SIGNIFICANT CHANGE UP (ref 1.8–2.4)
PHOSPHATE SERPL-MCNC: 3 MG/DL — SIGNIFICANT CHANGE UP (ref 2.1–4.9)
POTASSIUM SERPL-MCNC: 4.6 MMOL/L — SIGNIFICANT CHANGE UP (ref 3.5–5)
POTASSIUM SERPL-SCNC: 4.6 MMOL/L — SIGNIFICANT CHANGE UP (ref 3.5–5)
SARS-COV-2 IGG SERPL QL IA: NEGATIVE — SIGNIFICANT CHANGE UP
SARS-COV-2 IGM SERPL IA-ACNC: 0.1 INDEX — SIGNIFICANT CHANGE UP
SODIUM SERPL-SCNC: 139 MMOL/L — SIGNIFICANT CHANGE UP (ref 135–146)
SPECIMEN SOURCE: SIGNIFICANT CHANGE UP

## 2020-10-02 PROCEDURE — 70549 MR ANGIOGRAPH NECK W/O&W/DYE: CPT | Mod: 26

## 2020-10-02 PROCEDURE — 99232 SBSQ HOSP IP/OBS MODERATE 35: CPT

## 2020-10-02 PROCEDURE — 70551 MRI BRAIN STEM W/O DYE: CPT | Mod: 26

## 2020-10-02 PROCEDURE — 99233 SBSQ HOSP IP/OBS HIGH 50: CPT

## 2020-10-02 PROCEDURE — 70544 MR ANGIOGRAPHY HEAD W/O DYE: CPT | Mod: 26,59

## 2020-10-02 RX ORDER — FAMOTIDINE 10 MG/ML
20 INJECTION INTRAVENOUS DAILY
Refills: 0 | Status: DISCONTINUED | OUTPATIENT
Start: 2020-10-02 | End: 2020-10-03

## 2020-10-02 RX ORDER — SIMVASTATIN 20 MG/1
0 TABLET, FILM COATED ORAL
Qty: 0 | Refills: 0 | DISCHARGE

## 2020-10-02 RX ORDER — ASPIRIN/CALCIUM CARB/MAGNESIUM 324 MG
1 TABLET ORAL
Qty: 30 | Refills: 0
Start: 2020-10-02 | End: 2020-10-31

## 2020-10-02 RX ORDER — ATORVASTATIN CALCIUM 80 MG/1
1 TABLET, FILM COATED ORAL
Qty: 30 | Refills: 0
Start: 2020-10-02 | End: 2020-10-31

## 2020-10-02 RX ORDER — AMLODIPINE BESYLATE 2.5 MG/1
0 TABLET ORAL
Qty: 0 | Refills: 0 | DISCHARGE

## 2020-10-02 RX ORDER — AMLODIPINE BESYLATE 2.5 MG/1
1 TABLET ORAL
Qty: 0 | Refills: 0 | DISCHARGE
Start: 2020-10-02

## 2020-10-02 RX ORDER — OLMESARTAN MEDOXOMIL 5 MG/1
0 TABLET, FILM COATED ORAL
Qty: 0 | Refills: 0 | DISCHARGE

## 2020-10-02 RX ADMIN — Medication 81 MILLIGRAM(S): at 11:55

## 2020-10-02 RX ADMIN — FAMOTIDINE 20 MILLIGRAM(S): 10 INJECTION INTRAVENOUS at 23:10

## 2020-10-02 RX ADMIN — CHLORHEXIDINE GLUCONATE 1 APPLICATION(S): 213 SOLUTION TOPICAL at 05:25

## 2020-10-02 RX ADMIN — ATORVASTATIN CALCIUM 40 MILLIGRAM(S): 80 TABLET, FILM COATED ORAL at 22:33

## 2020-10-02 RX ADMIN — PANTOPRAZOLE SODIUM 40 MILLIGRAM(S): 20 TABLET, DELAYED RELEASE ORAL at 05:24

## 2020-10-02 RX ADMIN — HEPARIN SODIUM 5000 UNIT(S): 5000 INJECTION INTRAVENOUS; SUBCUTANEOUS at 22:33

## 2020-10-02 RX ADMIN — HEPARIN SODIUM 5000 UNIT(S): 5000 INJECTION INTRAVENOUS; SUBCUTANEOUS at 05:24

## 2020-10-02 RX ADMIN — AMLODIPINE BESYLATE 5 MILLIGRAM(S): 2.5 TABLET ORAL at 05:24

## 2020-10-02 NOTE — DISCHARGE NOTE PROVIDER - CARE PROVIDER_API CALL
Baljit Mendes  EEG/EPILEPSY  [id number] [facility], [address]  [place] [address]  Phone: [phone]  Fax: [phone]  Follow Up Time: 1 week

## 2020-10-02 NOTE — PROGRESS NOTE ADULT - SUBJECTIVE AND OBJECTIVE BOX
Neurology Follow up note    Name  ALMAZ PRECIADO    HPI:   Patient is a 79y old  Female who presents with a chief complaint of numbness/ weakness - 2 days    T(F): 98.4 (09-30-20 @ 20:14), Max: 98.4 (09-30-20 @ 20:14)  HR: 65 (09-30-20 @ 21:17)  BP: 108/61 (09-30-20 @ 21:17)  RR: 18 (09-30-20 @ 21:17)  SpO2: 100% (09-30-20 @ 21:17) (98% - 100%)    PHYSICAL EXAM:  GENERAL: NAD, well-groomed, well-developed  HEAD:  Atraumatic, Normocephalic  EYES: EOMI, PERRLA, conjunctiva and sclera clear  ENMT: No tonsillar erythema, exudates, or enlargement; Moist mucous membranes, Good dentition, No lesions  NECK: Supple, No JVD, Normal thyroid  NERVOUS SYSTEM:  Alert & Oriented X3, Good concentration; Motor Strength 5/5 B/L upper and lower extremities; DTRs 2+ intact and symmetric  CHEST/LUNG: Clear to percussion bilaterally; No rales, rhonchi, wheezing, or rubs  HEART: Regular rate and rhythm; No murmurs, rubs, or gallops  ABDOMEN: Soft, Nontender, Nondistended; Bowel sounds present  EXTREMITIES:  2+ Peripheral Pulses, No clubbing, cyanosis, or edema  LYMPH: No lymphadenopathy noted  SKIN: No rashes or lesions    labs  09-30    135  |  102  |  34<H>  ----------------------------<  111<H>  5.6<H>   |  22  |  1.9<H>    Ca    10.4<H>      30 Sep 2020 20:21    TPro  7.8  /  Alb  4.2  /  TBili  <0.2  /  DBili  x   /  AST  40  /  ALT  19  /  AlkPhos  72  09-30                          11.4   12.32 )-----------( 346      ( 30 Sep 2020 20:21 )             35.7         PT/INR - ( 30 Sep 2020 20:21 )   PT: 12.10 sec;   INR: 1.05 ratio         PTT - ( 30 Sep 2020 20:21 )  PTT:30.7 sec      radiology    aspirin 325 milliGRAM(s) Oral Once   (30 Sep 2020 22:14)      Interval History:    patient feels well  no new complaints    Vital Signs Last 24 Hrs  T(C): 36.1 (02 Oct 2020 07:01), Max: 37 (01 Oct 2020 23:19)  T(F): 97 (02 Oct 2020 07:01), Max: 98.6 (01 Oct 2020 23:19)  HR: 59 (02 Oct 2020 05:57) (59 - 92)  BP: 112/53 (02 Oct 2020 05:57) (112/53 - 130/60)  BP(mean): 77 (02 Oct 2020 05:57) (77 - 86)  RR: 18 (02 Oct 2020 07:01) (18 - 22)  SpO2: 100% (02 Oct 2020 05:57) (100% - 100%)    Neurological Exam:   Mental status: Awake, alert and oriented x3.  Recent and remote memory intact.  Naming, repetition and comprehension intact.  Attention/concentration intact.  No dysarthria, no aphasia.  Fund of knowledge appropriate.    Cranial nerves: Pupils equally round and reactive to light, visual fields full, no nystagmus, extraocular muscles intact, V1 through V3 intact bilaterally and symmetric, face symmetric, hearing intact to finger rub, palate elevation symmetric, tongue was midline.  Motor:  MRC grading 5/5 b/l UE/LE.   strength 5/5.  Normal tone and bulk.  No abnormal movements.    Sensation: Intact to light touch, proprioception, and pinprick.   Coordination: No dysmetria on finger-to-nose and heel-to-shin.  No dysdiadokinesia.  Reflexes: 2+ in bilateral UE/LE, downgoing toes bilaterally. (-) Juárez.  Gait: Narrow and steady. No ataxia.  Romberg negative    Medications  amLODIPine   Tablet 5 milliGRAM(s) Oral daily  aspirin enteric coated 81 milliGRAM(s) Oral daily  atorvastatin 40 milliGRAM(s) Oral at bedtime  chlorhexidine 4% Liquid 1 Application(s) Topical <User Schedule>  heparin   Injectable 5000 Unit(s) SubCutaneous every 8 hours  LORazepam   Injectable 1 milliGRAM(s) IV Push once PRN  pantoprazole    Tablet 40 milliGRAM(s) Oral before breakfast      Lab  10-02    139  |  108  |  26<H>  ----------------------------<  103<H>  4.6   |  23  |  1.2    Ca    9.2      02 Oct 2020 05:55  Phos  3.0     10-02  Mg     2.0     10-02    TPro  6.5  /  Alb  3.6  /  TBili  <0.2  /  DBili  <0.2  /  AST  20  /  ALT  15  /  AlkPhos  75  10-01                          10.3   10.76 )-----------( 358      ( 01 Oct 2020 05:42 )             32.1     LIVER FUNCTIONS - ( 01 Oct 2020 05:42 )  Alb: 3.6 g/dL / Pro: 6.5 g/dL / ALK PHOS: 75 U/L / ALT: 15 U/L / AST: 20 U/L / GGT: x             2.0        Radiology      Assessment:  TIA. Pt with dld and htn w/ transient visual loss OD  NIHSS 0 now  Plan:  mri brain /mra h/n today  carotid doppler pending  f/u echo  cont asa and statin  if mri/a is unremarkable, may dc home to follow up at stroke clinic in 1-2 wks

## 2020-10-02 NOTE — DISCHARGE NOTE PROVIDER - NSDCCPCAREPLAN_GEN_ALL_CORE_FT
PRINCIPAL DISCHARGE DIAGNOSIS  Diagnosis: TIA (transient ischemic attack)  Assessment and Plan of Treatment: You experienced a TIA (transient ischemic attack).  Please keep taking aspirin and cholersterol medication and follow up with the stroke clinic.      SECONDARY DISCHARGE DIAGNOSES  Diagnosis: SARAVANAN (acute kidney injury)  Assessment and Plan of Treatment: you were found to have high kidney number ( creatine). Kidney function came back to baseline after holding some of your blood pressure medications ( olmesartan and hydrochlorothiazide) and with some IV fluids.     PRINCIPAL DISCHARGE DIAGNOSIS  Diagnosis: TIA (transient ischemic attack)  Assessment and Plan of Treatment: You experienced a TIA (transient ischemic attack).  Please keep taking aspirin and cholersterol medication and follow up with the stroke clinic. 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 As per neurology there is no further inpatient intervention needed, you must follow up in 1-2 weeks.      SECONDARY DISCHARGE DIAGNOSES  Diagnosis: SARAVANAN (acute kidney injury)  Assessment and Plan of Treatment: you were found to have high kidney number ( creatine). Kidney function came back to baseline after holding some of your blood pressure medications ( olmesartan and hydrochlorothiazide) and with some IV fluids.

## 2020-10-02 NOTE — PROGRESS NOTE ADULT - SUBJECTIVE AND OBJECTIVE BOX
CHIEF COMPLAINT:    Patient is a 79y old  Female who presents with a chief complaint of vision loss      INTERVAL HPI/OVERNIGHT EVENTS:    Patient seen and examined at bedside. No acute overnight events occurred.    ROS: Denies change in vision, blurry vision. All other systems are negative.    Vital Signs:    T(F): 97 (10-02-20 @ 07:01), Max: 98.6 (10-01-20 @ 23:19)  HR: 59 (10-02-20 @ 05:57) (59 - 92)  BP: 112/53 (10-02-20 @ 05:57) (112/53 - 130/60)  RR: 18 (10-02-20 @ 07:01) (18 - 22)  SpO2: 100% (10-02-20 @ 05:57) (100% - 100%)  I&O's Summary    01 Oct 2020 07:01  -  02 Oct 2020 07:00  --------------------------------------------------------  IN: 950 mL / OUT: 550 mL / NET: 400 mL    02 Oct 2020 07:01  -  02 Oct 2020 10:01  --------------------------------------------------------  IN: 210 mL / OUT: 0 mL / NET: 210 mL    PHYSICAL EXAM:  GENERAL:  NAD  SKIN: No rashes or lesions  HEENT: Atraumatic. Normocephalic. Anicteric  NECK:  No JVD.   PULMONARY: Clear to ausculation bilaterally. No wheezing. No rales  CVS: Normal S1, S2. Regular rate and rhythm. No murmurs.  ABDOMEN/GI: Soft, Nontender, Nondistended; Bowel sounds are present  EXTREMITIES:  No edema B/L LE.  NEUROLOGIC:  No motor deficit. EOMI, PERRLA  PSYCH: Alert & oriented x 3, normal affect    Consultant(s) Notes Reviewed:  [x ] YES  [ ] NO  Care Discussed with Consultants/Other Providers [ x] YES  [ ] NO - neurology    LABS:                        10.3   10.76 )-----------( 358      ( 01 Oct 2020 05:42 )             32.1     10-02    139  |  108  |  26<H>  ----------------------------<  103<H>  4.6   |  23  |  1.2    Ca    9.2      02 Oct 2020 05:55  Phos  3.0     10-02  Mg     2.0     10-02    TPro  6.5  /  Alb  3.6  /  TBili  <0.2  /  DBili  <0.2  /  AST  20  /  ALT  15  /  AlkPhos  75  10-01    PT/INR - ( 30 Sep 2020 20:21 )   PT: 12.10 sec;   INR: 1.05 ratio         PTT - ( 30 Sep 2020 20:21 )  PTT:30.7 sec    Trop <0.01, CKMB --, CK --, 09-30-20 @ 20:21        RADIOLOGY & ADDITIONAL TESTS:  MRI pending    Telemetry reviewed independently - no acute events    Medications:  Standing  amLODIPine   Tablet 5 milliGRAM(s) Oral daily  aspirin enteric coated 81 milliGRAM(s) Oral daily  atorvastatin 40 milliGRAM(s) Oral at bedtime  chlorhexidine 4% Liquid 1 Application(s) Topical <User Schedule>  heparin   Injectable 5000 Unit(s) SubCutaneous every 8 hours  pantoprazole    Tablet 40 milliGRAM(s) Oral before breakfast    PRN Meds  LORazepam   Injectable 1 milliGRAM(s) IV Push once PRN      Case discussed with resident  Care discussed with pt

## 2020-10-02 NOTE — DISCHARGE NOTE PROVIDER - NSDCMRMEDTOKEN_GEN_ALL_CORE_FT
aspirin 81 mg oral delayed release tablet: 1 tab(s) orally once a day  atorvastatin 40 mg oral tablet: 1 tab(s) orally once a day (at bedtime)  Norvasc:    amLODIPine 5 mg oral tablet: 1 tab(s) orally once a day  aspirin 81 mg oral delayed release tablet: 1 tab(s) orally once a day  atorvastatin 40 mg oral tablet: 1 tab(s) orally once a day (at bedtime)

## 2020-10-02 NOTE — PROGRESS NOTE ADULT - ATTENDING COMMENTS
Attending Statement: I have personally performed a face to face diagnostic evaluation on this patient. The patient is suffering from TIA.   I have reviewed the above note and agree with the history, exam and suggestions for care, except as I have noted in the text.
Patient seen and evaluated independently medical resident note reviewed, I agree with plan and management, except as I have noted.

## 2020-10-02 NOTE — PROGRESS NOTE ADULT - ASSESSMENT
80 yo F PMHx of CKD III, HTN, DLD presented for evaluation of vision loss x 6 minutes.    Amaurosis fugax / TIA  vs CVA   - CTH unremarkable  - case dw neurology- MRI today at 1 pm, if negative d/c home  - echo pending  - carotid duplex unremarkable  - c/w simvastatin  - total cholesterol 146  - a1c pending    SARAVANAN on CKD III  - appears prerenal  - resolved    HTN  - saravanan resolved, can restart ARB  - therapeutic interchange losartan for omlesartan    # DVT PPx - Heparin 5000 mg SubQ    # Activity -  increase as Tolerated    Full Code      #Progress Note Handoff:  Pending (specify):  MRI  Family discussion: discussed pending MRI and d/c home if negative with patient  Disposition: Home_x__/SNF___/Other________/Unknown at this time____

## 2020-10-02 NOTE — DISCHARGE NOTE PROVIDER - HOSPITAL COURSE
Patient is a 79y old  Female knwon to have HTN who presents with a chief complaint of numbness/ weakness - 2 days as well as 6 min transient visual loss right eye.  she is at her baseline this am. no hx of cva  Symptoms completely resolved on presentation. No events during hospital stay.    # TIA   - CTH unremarkable  -  MRI today at 1 pm: per neuro if negative d/c home  - echo nonsignificant findings  - carotid duplex unremarkable  - c/w simvastatin  and ASA  - total cholesterol 146  - a1c 6.1    SARAVANAN on CKD III  - appears prerenal vs bactrim associated, resolved with holding ARB/HTZ and trial of fluids    HTN  - BP well controlled during hospitalization off ARB/HCTZ  - For now continue Amlodipine only   Patient is a 79y old  Female knwon to have HTN who presents with a chief complaint of numbness/ weakness - 2 days as well as 6 min transient visual loss right eye.  she is at her baseline this am. no hx of cva  Symptoms completely resolved on presentation. No events during hospital stay.    # TIA   - CTH unremarkable  -  MRI negative  - echo nonsignificant findings  - carotid duplex unremarkable  - c/w simvastatin  and ASA  - total cholesterol 146  - a1c 6.1    SARAVANAN on CKD III  - appears prerenal vs bactrim associated, resolved with holding ARB/HTZ and trial of fluids    HTN  - BP well controlled during hospitalization off ARB/HCTZ  - For now continue Amlodipine only   Patient is a 79y old  Female knwon to have HTN who presents with a chief complaint of numbness/ weakness - 2 days as well as 6 min transient visual loss right eye.  she is at her baseline this am. no hx of cva  Symptoms completely resolved on presentation. No events during hospital stay.    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

## 2020-10-02 NOTE — PROGRESS NOTE ADULT - SUBJECTIVE AND OBJECTIVE BOX
Patient is a 79y old  Female who presents with a chief complaint of CVA/ TIA (02 Oct 2020 10:01)        Over Night Events:  No overnight events. Afebrile. Never on pressors. Patient asymptomatic.       ROS:     All pertinent ROS are negative except HPI         PHYSICAL EXAM    ICU Vital Signs Last 24 Hrs  T(C): 36.1 (02 Oct 2020 07:01), Max: 37 (01 Oct 2020 23:19)  T(F): 97 (02 Oct 2020 07:01), Max: 98.6 (01 Oct 2020 23:19)  HR: 59 (02 Oct 2020 05:57) (59 - 92)  BP: 112/53 (02 Oct 2020 05:57) (112/53 - 130/60)  BP(mean): 77 (02 Oct 2020 05:57) (77 - 86)  ABP: --  ABP(mean): --  RR: 18 (02 Oct 2020 07:01) (18 - 22)  SpO2: 100% (02 Oct 2020 05:57) (100% - 100%)      CONSTITUTIONAL:   Ill appearing.  Well nourished.  NAD    ENT:   Airway patent,   Mouth with normal mucosa.   No thrush    EYES:   Pupils equal,   Round and reactive to light.    CARDIAC:   Normal rate,   Regular rhythm.    No edema    Vascular:  Normal systolic impulse  No Carotid bruits    RESPIRATORY:   No wheezing  Bilateral BS  Normal chest expansion  Not tachypneic,  No use of accessory muscles    GASTROINTESTINAL:  Abdomen soft,   Non-tender,   No guarding,   + BS    MUSCULOSKELETAL:   Range of motion is not limited,  No clubbing, cyanosis    NEUROLOGICAL:   AOX4  Alert and oriented   No motor  deficits.  pertinent DTRs normal    SKIN:   Skin normal color for race,   Warm and dry  No evidence of rash.    PSYCHIATRIC:   Normal mood and affect.   No apparent risk to self or others.      10-01-20 @ 07:01  -  10-02-20 @ 07:00  --------------------------------------------------------  IN:    sodium chloride 0.9%: 950 mL  Total IN: 950 mL    OUT:    Voided (mL): 550 mL  Total OUT: 550 mL    Total NET: 400 mL      10-02-20 @ 07:01  -  10-02-20 @ 10:12  --------------------------------------------------------  IN:    Oral Fluid: 210 mL  Total IN: 210 mL    OUT:  Total OUT: 0 mL    Total NET: 210 mL          LABS:                            10.3   10.76 )-----------( 358      ( 01 Oct 2020 05:42 )             32.1                                               10    139  |  108  |  26<H>  ----------------------------<  103<H>  4.6   |  23  |  1.2    Ca    9.2      02 Oct 2020 05:55  Phos  3.0     10  Mg     2.0     10-02    TPro  6.5  /  Alb  3.6  /  TBili  <0.2  /  DBili  <0.2  /  AST  20  /  ALT  15  /  AlkPhos  75  1001      PT/INR - ( 30 Sep 2020 20:21 )   PT: 12.10 sec;   INR: 1.05 ratio         PTT - ( 30 Sep 2020 20:21 )  PTT:30.7 sec                                       Urinalysis Basic - ( 01 Oct 2020 08:20 )    Color: Yellow / Appearance: Clear / S.025 / pH: x  Gluc: x / Ketone: Negative  / Bili: Negative / Urobili: 0.2 mg/dL   Blood: x / Protein: Negative mg/dL / Nitrite: Negative   Leuk Esterase: Small / RBC: 1-2 /HPF / WBC 6-10 /HPF   Sq Epi: x / Non Sq Epi: Few /HPF / Bacteria: Few        CARDIAC MARKERS ( 30 Sep 2020 20:21 )  x     / <0.01 ng/mL / x     / x     / x                                                LIVER FUNCTIONS - ( 01 Oct 2020 05:42 )  Alb: 3.6 g/dL / Pro: 6.5 g/dL / ALK PHOS: 75 U/L / ALT: 15 U/L / AST: 20 U/L / GGT: x                                                                                                                                       MEDICATIONS  (STANDING):  amLODIPine   Tablet 5 milliGRAM(s) Oral daily  aspirin enteric coated 81 milliGRAM(s) Oral daily  atorvastatin 40 milliGRAM(s) Oral at bedtime  chlorhexidine 4% Liquid 1 Application(s) Topical <User Schedule>  heparin   Injectable 5000 Unit(s) SubCutaneous every 8 hours  pantoprazole    Tablet 40 milliGRAM(s) Oral before breakfast    MEDICATIONS  (PRN):  LORazepam   Injectable 1 milliGRAM(s) IV Push once PRN Anxiety      New X-rays reviewed:                                                                                  ECHO    CXR interpreted by me:

## 2020-10-02 NOTE — PROGRESS NOTE ADULT - SUBJECTIVE AND OBJECTIVE BOX
ALMAZ PRECIADO 79y Female  MRN#: [id number]   CODE STATUS: Full      SUBJECTIVE  79 year old lady areli to have HTN who presented for transient right sided vision loss that lasted for 6 minutes. Admitted for TIA. Patient's symptoms completely resolved now.      OBJECTIVE  PAST MEDICAL & SURGICAL HISTORY  Hypertension    High blood cholesterol      ALLERGIES:  No Known Allergies    MEDICATIONS:  STANDING MEDICATIONS  amLODIPine   Tablet 5 milliGRAM(s) Oral daily  aspirin enteric coated 81 milliGRAM(s) Oral daily  atorvastatin 40 milliGRAM(s) Oral at bedtime  chlorhexidine 4% Liquid 1 Application(s) Topical <User Schedule>  heparin   Injectable 5000 Unit(s) SubCutaneous every 8 hours  pantoprazole    Tablet 40 milliGRAM(s) Oral before breakfast    PRN MEDICATIONS  LORazepam   Injectable 1 milliGRAM(s) IV Push once PRN      VITAL SIGNS: Last 24 Hours  T(C): 36.1 (02 Oct 2020 07:01), Max: 37 (01 Oct 2020 23:19)  T(F): 97 (02 Oct 2020 07:01), Max: 98.6 (01 Oct 2020 23:19)  HR: 59 (02 Oct 2020 05:57) (59 - 92)  BP: 112/53 (02 Oct 2020 05:57) (112/53 - 130/60)  BP(mean): 77 (02 Oct 2020 05:57) (77 - 86)  RR: 18 (02 Oct 2020 07:01) (18 - 22)  SpO2: 100% (02 Oct 2020 05:57) (100% - 100%)    LABS:                        10.3   10.76 )-----------( 358      ( 01 Oct 2020 05:42 )             32.1     10    139  |  108  |  26<H>  ----------------------------<  103<H>  4.6   |  23  |  1.2    Ca    9.2      02 Oct 2020 05:55  Phos  3.0     10-  Mg     2.0     10-    TPro  6.5  /  Alb  3.6  /  TBili  <0.2  /  DBili  <0.2  /  AST  20  /  ALT  15  /  AlkPhos  75  10    PT/INR - ( 30 Sep 2020 20:21 )   PT: 12.10 sec;   INR: 1.05 ratio         PTT - ( 30 Sep 2020 20:21 )  PTT:30.7 sec  Urinalysis Basic - ( 01 Oct 2020 08:20 )    Color: Yellow / Appearance: Clear / S.025 / pH: x  Gluc: x / Ketone: Negative  / Bili: Negative / Urobili: 0.2 mg/dL   Blood: x / Protein: Negative mg/dL / Nitrite: Negative   Leuk Esterase: Small / RBC: 1-2 /HPF / WBC 6-10 /HPF   Sq Epi: x / Non Sq Epi: Few /HPF / Bacteria: Few            CARDIAC MARKERS ( 30 Sep 2020 20:21 )  x     / <0.01 ng/mL / x     / x     / x          RADIOLOGY:      PHYSICAL EXAM:    GENERAL: NAD, well-developed, AAOx3  HEENT:  Atraumatic, Normocephalic. EOMI, PERRLA, conjunctiva and sclera clear, No JVD  PULMONARY: Clear to auscultation bilaterally; No wheeze  CARDIOVASCULAR: Regular rate and rhythm; No murmurs, rubs, or gallops  GASTROINTESTINAL: Soft, Nontender, Nondistended; Bowel sounds present  MUSCULOSKELETAL:  2+ Peripheral Pulses, No clubbing, cyanosis, or edema  NEUROLOGY: non-focal  SKIN: No rashes or lesions        ASSESSMENT & PLAN      # TIA   - CTH unremarkable  -  MRI today at 1 pm: per neuro if negative d/c home  - echo nonsignificant findings  - carotid duplex unremarkable  - c/w simvastatin  - total cholesterol 146  - a1c 6.1    SARAVANAN on CKD III  - appears prerenal vs bactrim associated, resolved with holding ARB/HTZ and trial of fluids    HTN  - BP well controlled during hospitilization off ARB/HCTZ  - For now continue Amlodipine only    # DVT PPx - Heparin 5000 mg SubQ    # Activity -  increase as Tolerated    Full Code

## 2020-10-02 NOTE — PROGRESS NOTE ADULT - ASSESSMENT
IMPRESSION:  TIA - patient back to baseline  SARAVANAN (Cr baseline ~1)- resolving    SUGGEST:    CNS: avoid CNS depressants. Neuro checks q4h. mri brain nc, mra head and neck. FU neuro.    HEENT: Oral care    PULMONARY:  HOB @ 30 degrees.  Aspiration precautions. Target SpO2>94%.     CARDIOVASCULAR: FU ECHO. A1c. avoid volume overload    GI: GI prophylaxis.  Feeding as tolerated.  Bowel regimen.    RENAL: Follow up lytes. Correct as needed. Trend Cr.     INFECTIOUS DISEASE: No abx.     HEMATOLOGICAL:  DVT prophylaxis. Repeat ESR.     ENDOCRINE:  Follow up FS.    MUSCULOSKELETAL: PT/ OT eval.     Keep in tele

## 2020-10-03 ENCOUNTER — TRANSCRIPTION ENCOUNTER (OUTPATIENT)
Age: 80
End: 2020-10-03

## 2020-10-03 VITALS — HEART RATE: 54 BPM | DIASTOLIC BLOOD PRESSURE: 54 MMHG | SYSTOLIC BLOOD PRESSURE: 110 MMHG

## 2020-10-03 PROCEDURE — 99239 HOSP IP/OBS DSCHRG MGMT >30: CPT

## 2020-10-03 RX ADMIN — FAMOTIDINE 20 MILLIGRAM(S): 10 INJECTION INTRAVENOUS at 11:42

## 2020-10-03 RX ADMIN — AMLODIPINE BESYLATE 5 MILLIGRAM(S): 2.5 TABLET ORAL at 05:32

## 2020-10-03 RX ADMIN — Medication 81 MILLIGRAM(S): at 11:41

## 2020-10-03 RX ADMIN — PANTOPRAZOLE SODIUM 40 MILLIGRAM(S): 20 TABLET, DELAYED RELEASE ORAL at 05:32

## 2020-10-03 RX ADMIN — HEPARIN SODIUM 5000 UNIT(S): 5000 INJECTION INTRAVENOUS; SUBCUTANEOUS at 05:32

## 2020-10-03 RX ADMIN — CHLORHEXIDINE GLUCONATE 1 APPLICATION(S): 213 SOLUTION TOPICAL at 05:32

## 2020-10-03 NOTE — DISCHARGE NOTE NURSING/CASE MANAGEMENT/SOCIAL WORK - PATIENT PORTAL LINK FT
You can access the FollowMyHealth Patient Portal offered by [facility] by registering at the following website: [email or web address]. By joining [employer]’s FollowMyHealth portal, you will also be able to view your health information using other applications (apps) compatible with our system.

## 2020-10-03 NOTE — DISCHARGE NOTE NURSING/CASE MANAGEMENT/SOCIAL WORK - NSDCPEPTSTRK_GEN_ALL_CORE
Stroke support groups for patients, families, and friends/Signs and symptoms of stroke/Risk factors for stroke/Stroke warning signs and symptoms/Call 911 for stroke/Need for follow up after discharge/Prescribed medications/Stroke education booklet

## 2020-10-03 NOTE — PROGRESS NOTE ADULT - ASSESSMENT
IMPRESSION:  TIA - patient back to baseline  SARAVANAN (Cr baseline ~1)- resolving    SUGGEST:    CNS: avoid CNS depressants. follow MRI result and follow neurology     HEENT: Oral care    PULMONARY:  HOB @ 30 degrees.  Aspiration precautions. Target SpO2>94%.     CARDIOVASCULAR: FU ECHO. A1c. avoid volume overload    GI: GI prophylaxis.  Feeding as tolerated.  Bowel regimen.    RENAL: Follow up lytes. Correct as needed. Trend Cr.     INFECTIOUS DISEASE: No abx.     HEMATOLOGICAL:  DVT prophylaxis.    ENDOCRINE:  Follow up FS.    MUSCULOSKELETAL: PT/ OT eval.     follow neurology if can be downgrade to floor   recall prn

## 2020-10-03 NOTE — PROGRESS NOTE ADULT - SUBJECTIVE AND OBJECTIVE BOX
Patient is a 79y old  Female who presents with a chief complaint of CVA/ TIA (02 Oct 2020 11:57)      Over Night Events:  Patient seen and examined.   pending MRI result     ROS:  See HPI    PHYSICAL EXAM    ICU Vital Signs Last 24 Hrs  T(C): 36.2 (03 Oct 2020 07:10), Max: 36.7 (02 Oct 2020 20:00)  T(F): 97.2 (03 Oct 2020 07:10), Max: 98 (02 Oct 2020 20:00)  HR: 54 (03 Oct 2020 07:12) (53 - 60)  BP: 110/54 (03 Oct 2020 07:12) (110/54 - 126/68)  BP(mean): 78 (03 Oct 2020 07:12) (78 - 87)  ABP: --  ABP(mean): --  RR: 20 (03 Oct 2020 07:10) (19 - 20)  SpO2: 97% (02 Oct 2020 23:15) (97% - 97%)      General: Aox  HEENT:      daniel          Lymph Nodes: NO cervical LN   Lungs: Bilateral BS  Cardiovascular: Regular   Abdomen: Soft, Positive BS  Extremities: No clubbing   Skin: warm   Neurological: no focal   Musculoskeletal: move all ext     I&O's Detail    02 Oct 2020 07:01  -  03 Oct 2020 07:00  --------------------------------------------------------  IN:    Oral Fluid: 450 mL    sodium chloride 0.9%: 100 mL  Total IN: 550 mL    OUT:    Voided (mL): 1750 mL  Total OUT: 1750 mL    Total NET: -1200 mL          LABS:          02 Oct 2020 05:55    139    |  108    |  26     ----------------------------<  103    4.6     |  23     |  1.2      Ca    9.2        02 Oct 2020 05:55  Phos  3.0       02 Oct 2020 05:55  Mg     2.0       02 Oct 2020 05:55                                                                                      Urinalysis Basic - ( 01 Oct 2020 08:20 )    Color: Yellow / Appearance: Clear / S.025 / pH: x  Gluc: x / Ketone: Negative  / Bili: Negative / Urobili: 0.2 mg/dL   Blood: x / Protein: Negative mg/dL / Nitrite: Negative   Leuk Esterase: Small / RBC: 1-2 /HPF / WBC 6-10 /HPF   Sq Epi: x / Non Sq Epi: Few /HPF / Bacteria: Few                                                                Culture - Urine (collected 01 Oct 2020 08:25)  Source: .Urine Clean Catch (Midstream)  Final Report (02 Oct 2020 16:54):    <10,000 CFU/mL Normal Urogenital Anh                                                                                           MEDICATIONS  (STANDING):  amLODIPine   Tablet 5 milliGRAM(s) Oral daily  aspirin enteric coated 81 milliGRAM(s) Oral daily  atorvastatin 40 milliGRAM(s) Oral at bedtime  chlorhexidine 4% Liquid 1 Application(s) Topical <User Schedule>  famotidine    Tablet 20 milliGRAM(s) Oral daily  heparin   Injectable 5000 Unit(s) SubCutaneous every 8 hours  pantoprazole    Tablet 40 milliGRAM(s) Oral before breakfast    MEDICATIONS  (PRN):  LORazepam   Injectable 1 milliGRAM(s) IV Push once PRN Anxiety          Xrays:  TLC:  OG:  ET tube:                                                                                       ECHO:  CAM ICU:    I

## 2020-10-05 ENCOUNTER — APPOINTMENT (OUTPATIENT)
Dept: NEUROLOGY | Facility: CLINIC | Age: 80
End: 2020-10-05
Payer: MEDICARE

## 2020-10-05 VITALS
SYSTOLIC BLOOD PRESSURE: 129 MMHG | DIASTOLIC BLOOD PRESSURE: 74 MMHG | OXYGEN SATURATION: 97 % | BODY MASS INDEX: 25.61 KG/M2 | HEART RATE: 91 BPM | HEIGHT: 64 IN | WEIGHT: 150 LBS | TEMPERATURE: 98.7 F

## 2020-10-05 DIAGNOSIS — Z86.73 PERSONAL HISTORY OF TRANSIENT ISCHEMIC ATTACK (TIA), AND CEREBRAL INFARCTION W/OUT RESIDUAL DEFICITS: ICD-10-CM

## 2020-10-05 DIAGNOSIS — I66.9 OCCLUSION AND STENOSIS OF UNSPECIFIED CEREBRAL ARTERY: ICD-10-CM

## 2020-10-05 PROBLEM — I10 ESSENTIAL (PRIMARY) HYPERTENSION: Chronic | Status: ACTIVE | Noted: 2020-09-30

## 2020-10-05 PROBLEM — E78.00 PURE HYPERCHOLESTEROLEMIA, UNSPECIFIED: Chronic | Status: ACTIVE | Noted: 2020-09-30

## 2020-10-05 PROCEDURE — 99214 OFFICE O/P EST MOD 30 MIN: CPT

## 2020-10-05 RX ORDER — KETOROLAC TROMETHAMINE 4 MG/ML
0.4 SOLUTION/ DROPS OPHTHALMIC
Qty: 5 | Refills: 0 | Status: DISCONTINUED | COMMUNITY
Start: 2018-02-07 | End: 2020-10-05

## 2020-10-05 RX ORDER — AMLODIPINE BESYLATE 5 MG/1
5 TABLET ORAL
Refills: 0 | Status: ACTIVE | COMMUNITY

## 2020-10-05 RX ORDER — CLOBETASOL PROPIONATE 0.5 MG/G
0.05 OINTMENT TOPICAL
Qty: 60 | Refills: 0 | Status: DISCONTINUED | COMMUNITY
Start: 2017-07-05 | End: 2020-10-05

## 2020-10-05 RX ORDER — ATORVASTATIN CALCIUM 40 MG/1
40 TABLET, FILM COATED ORAL
Refills: 0 | Status: ACTIVE | COMMUNITY

## 2020-10-05 RX ORDER — ASPIRIN 81 MG
81 TABLET, DELAYED RELEASE (ENTERIC COATED) ORAL
Refills: 0 | Status: ACTIVE | COMMUNITY

## 2020-10-05 RX ORDER — PROMETHAZINE HYDROCHLORIDE AND DEXTROMETHORPHAN HYDROBROMIDE ORAL SOLUTION 15; 6.25 MG/5ML; MG/5ML
6.25-15 SOLUTION ORAL
Qty: 118 | Refills: 0 | Status: DISCONTINUED | COMMUNITY
Start: 2017-12-06 | End: 2020-10-05

## 2020-10-05 RX ORDER — SIMVASTATIN 20 MG/1
20 TABLET, FILM COATED ORAL
Qty: 90 | Refills: 0 | Status: DISCONTINUED | COMMUNITY
Start: 2017-08-15 | End: 2020-10-05

## 2020-10-05 RX ORDER — VALSARTAN AND HYDROCHLOROTHIAZIDE 80; 12.5 MG/1; MG/1
80-12.5 TABLET, FILM COATED ORAL
Qty: 90 | Refills: 0 | Status: DISCONTINUED | COMMUNITY
Start: 2017-12-27 | End: 2020-10-05

## 2020-10-05 RX ORDER — LATANOPROST/PF 0.005 %
0.01 DROPS OPHTHALMIC (EYE)
Qty: 2 | Refills: 0 | Status: DISCONTINUED | COMMUNITY
Start: 2018-01-25 | End: 2020-10-05

## 2020-10-05 RX ORDER — PREDNISOLONE ACETATE 10 MG/ML
1 SUSPENSION/ DROPS OPHTHALMIC
Qty: 5 | Refills: 0 | Status: DISCONTINUED | COMMUNITY
Start: 2017-11-29 | End: 2020-10-05

## 2020-10-05 RX ORDER — MOXIFLOXACIN OPHTHALMIC 5 MG/ML
0.5 SOLUTION/ DROPS OPHTHALMIC
Qty: 3 | Refills: 0 | Status: DISCONTINUED | COMMUNITY
Start: 2017-11-29 | End: 2020-10-05

## 2020-10-06 PROBLEM — I66.9 STENOSIS OF INTRACRANIAL VESSEL: Status: ACTIVE | Noted: 2020-10-06

## 2020-10-06 PROBLEM — Z86.73 HISTORY OF TIA (TRANSIENT ISCHEMIC ATTACK): Status: ACTIVE | Noted: 2020-10-06

## 2020-10-06 NOTE — HISTORY OF PRESENT ILLNESS
[FreeTextEntry1] : Patient is a 79 year old woman with history of htn and dld who presents for hospital follow up for TIA.  Patient presented to [facility] ED w/ transient visual loss of right eye, generalized numbness and weakness.   \par \par Patient is doing well and does not have any complaints.  Her vision is normal. She denies numbness, weakness, syncope.  She does not have facial droop and she does not have slurred speech.  \par \par Patient had unremarkable CTH.  MRI negative except for Moderate stenosis of the left cavernous ICA with\par irregular contour with suggestion of a medial wall outpouching. Carotid duplex unremarkable. Echo unremarkable.  \par \par Patient states she is taking amlodipine 5 mg, ASA 81 mg and atorvastatin 40 mg.  She is doing well and tolerating medications.\par \par \par \par

## 2020-10-06 NOTE — ASSESSMENT
[FreeTextEntry1] : Patient is a 79 year old woman with history of htn and dld who presents for hospital follow up for TIA.  Patient presented to [facility] ED w/ transient visual loss of right eye, generalized numbness and weakness.   \par \par Ms. Salgado is doing well since discharge and has no complaints.  At [facility] patients MRA Brain showed  \par Moderate stenosis of the left cavernous ICA with irregular contour with suggestion of a medial wall outpouching. Correlation with CTA of the brain is recommended for further evaluation to exclude aneurysm.\par \par Plan:\par 1. CTA\par 2. Continue stroke prevention medications Amlodipine, ASA and atorvastatin\par 2. Follow up with PCP for management of BP, cholesterol  and A1C\par 3. B/W for BMP\par 4. Will call with results and follow up appointment

## 2020-10-06 NOTE — PHYSICAL EXAM
[FreeTextEntry1] : Physical examination:  \par General:   The patient is pleasant, cooperative, well dressed and in no acute distress.  Appearance is consistent with chronologic age.  No abnormal facies.\par Neurologic examination:  The patient is oriented to person, place, time and date.   Remote and recent memory is normal.   Fund of knowledge is intact and normal.  Language with normal repetition, comprehension and naming.  Nondysarthric.   \par Cranial nerves examination: intact VA, VFF.  EOMI w/o nystagmus, skew or reported double vision.  PERRL.  No ptosis/weakness of eyelid closure.  Facial sensation is normal with normal bite.  No facial asymmetry.  Hearing grossly intact b/l.  Palate elevates midline.  Neck flexion/extension, SCM/Trap strength normal.  Tongue midline with full resistance.  \par Motor examination:   Normal tone, bulk and range of motion.  No tenderness, twitching, tremors or involuntary movements.      \par Formal Muscle Strength Testing: (MRC grade R/L) 5/5 RUSS, BB, TR, WF, WE, FF, FE; 5/5 ILP, QDS, HS, DF, PF.  Arises from sitting/squatting wnl.  Toe/heel walks.  \par Reflexes:   2+ b/l pectoralis, biceps, triceps, brachioradialis, patella and Achilles.  Plantar response downgoing b/l.  Jaw jerk, Jose Luis, clonus absent.  \par Sensory examination:   Intact to light touch and pinprick, pain, temperature and proprioception and vibration in all extremities.    \par Cerebellum:   FTN/HKS intact with normal DEACON in all limbs.   Gait is narrow based and normal with normal tandem.  Romberg (-).  No dysmetria or dysdiadokinesia.       \par \par

## 2020-10-08 DIAGNOSIS — E78.00 PURE HYPERCHOLESTEROLEMIA, UNSPECIFIED: ICD-10-CM

## 2020-10-08 DIAGNOSIS — G45.9 TRANSIENT CEREBRAL ISCHEMIC ATTACK, UNSPECIFIED: ICD-10-CM

## 2020-10-08 DIAGNOSIS — N17.9 ACUTE KIDNEY FAILURE, UNSPECIFIED: ICD-10-CM

## 2020-10-08 DIAGNOSIS — H26.9 UNSPECIFIED CATARACT: ICD-10-CM

## 2020-10-08 DIAGNOSIS — N18.30 CHRONIC KIDNEY DISEASE, STAGE 3 UNSPECIFIED: ICD-10-CM

## 2020-10-08 DIAGNOSIS — G45.3 AMAUROSIS FUGAX: ICD-10-CM

## 2020-10-08 DIAGNOSIS — I12.9 HYPERTENSIVE CHRONIC KIDNEY DISEASE WITH STAGE 1 THROUGH STAGE 4 CHRONIC KIDNEY DISEASE, OR UNSPECIFIED CHRONIC KIDNEY DISEASE: ICD-10-CM

## 2020-10-08 DIAGNOSIS — E78.5 HYPERLIPIDEMIA, UNSPECIFIED: ICD-10-CM

## 2020-10-08 DIAGNOSIS — H40.9 UNSPECIFIED GLAUCOMA: ICD-10-CM

## 2020-10-08 LAB
ALBUMIN SERPL ELPH-MCNC: 3.9 G/DL
ALP BLD-CCNC: 79 U/L
ALT SERPL-CCNC: 34 U/L
ANION GAP SERPL CALC-SCNC: 11 MMOL/L
AST SERPL-CCNC: 27 U/L
BILIRUB SERPL-MCNC: <0.2 MG/DL
BUN SERPL-MCNC: 17 MG/DL
CALCIUM SERPL-MCNC: 9.9 MG/DL
CHLORIDE SERPL-SCNC: 105 MMOL/L
CO2 SERPL-SCNC: 25 MMOL/L
CREAT SERPL-MCNC: 1 MG/DL
GLUCOSE SERPL-MCNC: 81 MG/DL
POTASSIUM SERPL-SCNC: 5 MMOL/L
PROT SERPL-MCNC: 6.9 G/DL
SODIUM SERPL-SCNC: 141 MMOL/L

## 2020-11-20 ENCOUNTER — APPOINTMENT (OUTPATIENT)
Dept: NEUROLOGY | Facility: CLINIC | Age: 80
End: 2020-11-20
Payer: MEDICARE

## 2020-11-20 PROCEDURE — 99442: CPT

## 2020-12-07 LAB
ANION GAP SERPL CALC-SCNC: 12 MMOL/L
BUN SERPL-MCNC: 16 MG/DL
CALCIUM SERPL-MCNC: 10.3 MG/DL
CHLORIDE SERPL-SCNC: 106 MMOL/L
CO2 SERPL-SCNC: 25 MMOL/L
CREAT SERPL-MCNC: 1.2 MG/DL
GLUCOSE SERPL-MCNC: 91 MG/DL
POTASSIUM SERPL-SCNC: 4.8 MMOL/L
SODIUM SERPL-SCNC: 143 MMOL/L

## 2020-12-18 ENCOUNTER — OUTPATIENT (OUTPATIENT)
Dept: OUTPATIENT SERVICES | Facility: HOSPITAL | Age: 80
LOS: 1 days | Discharge: HOME | End: 2020-12-18
Payer: MEDICARE

## 2020-12-18 DIAGNOSIS — I66.9 OCCLUSION AND STENOSIS OF UNSPECIFIED CEREBRAL ARTERY: ICD-10-CM

## 2020-12-18 DIAGNOSIS — Z86.73 PERSONAL HISTORY OF TRANSIENT ISCHEMIC ATTACK (TIA), AND CEREBRAL INFARCTION WITHOUT RESIDUAL DEFICITS: ICD-10-CM

## 2020-12-18 PROCEDURE — 70496 CT ANGIOGRAPHY HEAD: CPT | Mod: 26

## 2020-12-29 ENCOUNTER — RESULT REVIEW (OUTPATIENT)
Age: 80
End: 2020-12-29

## 2021-04-09 ENCOUNTER — APPOINTMENT (OUTPATIENT)
Dept: NEUROLOGY | Facility: CLINIC | Age: 81
End: 2021-04-09

## 2021-05-17 ENCOUNTER — NON-APPOINTMENT (OUTPATIENT)
Age: 81
End: 2021-05-17

## 2021-05-18 ENCOUNTER — OUTPATIENT (OUTPATIENT)
Dept: OUTPATIENT SERVICES | Facility: HOSPITAL | Age: 81
LOS: 1 days | Discharge: HOME | End: 2021-05-18
Payer: MEDICARE

## 2021-05-18 ENCOUNTER — RESULT REVIEW (OUTPATIENT)
Age: 81
End: 2021-05-18

## 2021-05-18 DIAGNOSIS — Z86.73 PERSONAL HISTORY OF TRANSIENT ISCHEMIC ATTACK (TIA), AND CEREBRAL INFARCTION WITHOUT RESIDUAL DEFICITS: ICD-10-CM

## 2021-05-18 PROCEDURE — 70450 CT HEAD/BRAIN W/O DYE: CPT | Mod: 26

## 2021-05-24 LAB
CRP SERPL-MCNC: 5 MG/L
ERYTHROCYTE [SEDIMENTATION RATE] IN BLOOD BY WESTERGREN METHOD: 11 MM/HR

## 2021-09-03 ENCOUNTER — OUTPATIENT (OUTPATIENT)
Dept: OUTPATIENT SERVICES | Facility: HOSPITAL | Age: 81
LOS: 1 days | Discharge: HOME | End: 2021-09-03
Payer: MEDICARE

## 2021-09-03 DIAGNOSIS — M54.5 LOW BACK PAIN: ICD-10-CM

## 2021-09-03 PROCEDURE — 72148 MRI LUMBAR SPINE W/O DYE: CPT | Mod: 26

## 2022-04-28 NOTE — H&P ADULT - NSHPLANGLIMITEDENGLISH_GEN_A_CORE
No 45 yr old female obese, on no medications denies any health issues other than being obese (BMI=47) pt aware of the need to loose weight and is trying. Pt stated she has been having abnormal bleeding where her periods will be spotting and than heavy. Pt did not need iron supplements or transfusions. Pt denies any fatigue or dizziness. Pt schedule for D&C hysteroscopy on 5/3/2022.

## 2022-10-13 ENCOUNTER — RX RENEWAL (OUTPATIENT)
Age: 82
End: 2022-10-13

## 2023-03-03 NOTE — ED ADULT TRIAGE NOTE - STATUS:
PT. A/o x4 calmer today , V/S stable, Pt has ostomy bag to old ileostomy site oozing out stool. Has pur-wick at bed time. IV Abx and D5. 45 infusing. Potassium replaced per protocol, pt tolerated well. Tolerated full liquids, denies N/V. Bed locked and in lower position call light within reach. Calls appropriately for assistance. Problem: Patient Centered Care  Goal: Patient preferences are identified and integrated in the patient's plan of care  Description: Interventions:  - What would you like us to know as we care for you?  I was just discharged a little over a week ago  - Provide timely, complete, and accurate information to patient/family  - Incorporate patient and family knowledge, values, beliefs, and cultural backgrounds into the planning and delivery of care  - Encourage patient/family to participate in care and decision-making at the level they choose  - Honor patient and family perspectives and choices  3/3/2023 1250 by Jermain Thomas RN lic pend  Outcome: Progressing  3/3/2023 0932 by Jermain Thomas RN lic pend  Outcome: Progressing     Problem: PAIN - ADULT  Goal: Verbalizes/displays adequate comfort level or patient's stated pain goal  Description: INTERVENTIONS:  - Encourage pt to monitor pain and request assistance  - Assess pain using appropriate pain scale  - Administer analgesics based on type and severity of pain and evaluate response  - Implement non-pharmacological measures as appropriate and evaluate response  - Consider cultural and social influences on pain and pain management  - Manage/alleviate anxiety  - Utilize distraction and/or relaxation techniques  - Monitor for opioid side effects  - Notify MD/LIP if interventions unsuccessful or patient reports new pain  - Anticipate increased pain with activity and pre-medicate as appropriate  3/3/2023 1250 by Jermain Thomas RN lic pend  Outcome: Progressing  3/3/2023 0932 by Jermain Thomas RN lic pend  Outcome: Progressing Problem: RISK FOR INFECTION - ADULT  Goal: Absence of fever/infection during anticipated neutropenic period  Description: INTERVENTIONS  - Monitor WBC  - Administer growth factors as ordered  - Implement neutropenic guidelines  Outcome: Progressing     Problem: DISCHARGE PLANNING  Goal: Discharge to home or other facility with appropriate resources  Description: INTERVENTIONS:  - Identify barriers to discharge w/pt and caregiver  - Include patient/family/discharge partner in discharge planning  - Arrange for needed discharge resources and transportation as appropriate  - Identify discharge learning needs (meds, wound care, etc)  - Arrange for interpreters to assist at discharge as needed  - Consider post-discharge preferences of patient/family/discharge partner  - Complete POLST form as appropriate  - Assess patient's ability to be responsible for managing their own health  - Refer to Case Management Department for coordinating discharge planning if the patient needs post-hospital services based on physician/LIP order or complex needs related to functional status, cognitive ability or social support system  3/3/2023 1250 by CHAPIS Serna pend  Outcome: Progressing  3/3/2023 0932 by CHAPIS Serna pend  Outcome: Progressing     Problem: GASTROINTESTINAL - ADULT  Goal: Minimal or absence of nausea and vomiting  Description: INTERVENTIONS:  - Maintain adequate hydration with IV or PO as ordered and tolerated  - Nasogastric tube to low intermittent suction as ordered  - Evaluate effectiveness of ordered antiemetic medications  - Provide nonpharmacologic comfort measures as appropriate  - Advance diet as tolerated, if ordered  - Obtain nutritional consult as needed  - Evaluate fluid balance  3/3/2023 1250 by CHAPIS Serna pend  Outcome: Progressing  3/3/2023 0932 by CHAPIS Serna pend  Outcome: Progressing  Goal: Maintains or returns to baseline bowel function  Description: INTERVENTIONS:  - Assess bowel function  - Maintain adequate hydration with IV or PO as ordered and tolerated  - Evaluate effectiveness of GI medications  - Encourage mobilization and activity  - Obtain nutritional consult as needed  - Establish a toileting routine/schedule  - Consider collaborating with pharmacy to review patient's medication profile  3/3/2023 1250 by Lana Root RN lic pend  Outcome: Progressing  3/3/2023 0932 by Lana Root RN lic pend  Outcome: Progressing     Problem: Patient/Family Goals  Goal: Patient/Family Long Term Goal  Description: Patient's Long Term Goal: to have ostomy issue resolved and return home    Interventions:  - complete required test and imaging  - complete IV abx as ordered  - monitor labs and vitals  - advance diet slowly as tolerated  - ambulation  - See additional Care Plan goals for specific interventions  Outcome: Progressing  Goal: Patient/Family Short Term Goal  Description: Patient's Short Term Goal: find out cause of drainage from old ostomy site    Interventions:   - test and imaging as ordered  - See additional Care Plan goals for specific interventions  Outcome: Progressing Intact

## 2023-07-31 ENCOUNTER — APPOINTMENT (OUTPATIENT)
Dept: ORTHOPEDIC SURGERY | Facility: CLINIC | Age: 83
End: 2023-07-31
Payer: MEDICARE

## 2023-07-31 PROCEDURE — 99213 OFFICE O/P EST LOW 20 MIN: CPT

## 2023-07-31 NOTE — ASSESSMENT
[FreeTextEntry1] : Shoulders and back are stable no injections necessary reassured her there is nothing pathologic in the tibia or the knee return as needed

## 2023-07-31 NOTE — IMAGING
[de-identified] : Pleasant easy to examine no distress neck good motion both shoulders minimal impingement  Lumbar spine mild spasm  Right knee good motion healed incision on the shin no swelling tenderness or deformity  X-rays right tib-fib unremarkable

## 2023-07-31 NOTE — REASON FOR VISIT
[FreeTextEntry2] : Patient is coming in for a follow up on her right leg. Last office visit 1/12/2022 was for her shoulders she has had injections in the past with good effect and the back pain which has been stable feeling some pain in the right shin had an injury when she was 6 with a laceration which healed slowly here for some concern about the shin pain

## 2023-11-10 RX ORDER — CYCLOBENZAPRINE HYDROCHLORIDE 5 MG/1
5 TABLET, FILM COATED ORAL
Qty: 30 | Refills: 0 | Status: ACTIVE | COMMUNITY
Start: 2022-10-13 | End: 1900-01-01

## 2023-12-26 ENCOUNTER — APPOINTMENT (OUTPATIENT)
Dept: ORTHOPEDIC SURGERY | Facility: CLINIC | Age: 83
End: 2023-12-26
Payer: MEDICARE

## 2023-12-26 PROCEDURE — 99213 OFFICE O/P EST LOW 20 MIN: CPT

## 2023-12-26 NOTE — IMAGING
[de-identified] : Pleasant easy to examine no distress neck good motion mild limits in rotation both shoulders minimal impingement  Lumbar spine mild spasm  Right knee good motion healed incision on the shin no swelling tenderness or deformity  X-rays right tib-fib unremarkable

## 2023-12-26 NOTE — REASON FOR VISIT
[FreeTextEntry2] : right leg pain doing home program exercise using Flexeril she reports stress sometimes aggravates the pain General pain on the left side of neck towards the mastoid has been using some heat Right shin is feeling better

## 2024-06-25 ENCOUNTER — APPOINTMENT (OUTPATIENT)
Dept: ORTHOPEDIC SURGERY | Facility: CLINIC | Age: 84
End: 2024-06-25
Payer: MEDICARE

## 2024-06-25 DIAGNOSIS — M54.2 CERVICALGIA: ICD-10-CM

## 2024-06-25 DIAGNOSIS — M51.9 UNSPECIFIED THORACIC, THORACOLUMBAR AND LUMBOSACRAL INTERVERTEBRAL DISC DISORDER: ICD-10-CM

## 2024-06-25 DIAGNOSIS — M17.11 UNILATERAL PRIMARY OSTEOARTHRITIS, RIGHT KNEE: ICD-10-CM

## 2024-06-25 DIAGNOSIS — M77.8 OTHER ENTHESOPATHIES, NOT ELSEWHERE CLASSIFIED: ICD-10-CM

## 2024-06-25 PROCEDURE — 99213 OFFICE O/P EST LOW 20 MIN: CPT

## 2024-06-25 RX ORDER — CYCLOBENZAPRINE HYDROCHLORIDE 5 MG/1
5 TABLET, FILM COATED ORAL
Qty: 90 | Refills: 0 | Status: ACTIVE | COMMUNITY
Start: 2023-07-31 | End: 1900-01-01

## 2024-12-17 ENCOUNTER — APPOINTMENT (OUTPATIENT)
Dept: ORTHOPEDIC SURGERY | Facility: CLINIC | Age: 84
End: 2024-12-17
Payer: MEDICARE

## 2024-12-17 DIAGNOSIS — M77.8 OTHER ENTHESOPATHIES, NOT ELSEWHERE CLASSIFIED: ICD-10-CM

## 2024-12-17 DIAGNOSIS — M51.9 UNSPECIFIED THORACIC, THORACOLUMBAR AND LUMBOSACRAL INTERVERTEBRAL DISC DISORDER: ICD-10-CM

## 2024-12-17 DIAGNOSIS — M54.2 CERVICALGIA: ICD-10-CM

## 2024-12-17 DIAGNOSIS — M17.11 UNILATERAL PRIMARY OSTEOARTHRITIS, RIGHT KNEE: ICD-10-CM

## 2024-12-17 DIAGNOSIS — S93.602A UNSPECIFIED SPRAIN OF LEFT FOOT, INITIAL ENCOUNTER: ICD-10-CM

## 2024-12-17 DIAGNOSIS — Z00.00 ENCOUNTER FOR GENERAL ADULT MEDICAL EXAMINATION W/OUT ABNORMAL FINDINGS: ICD-10-CM

## 2024-12-17 PROCEDURE — G2211 COMPLEX E/M VISIT ADD ON: CPT

## 2024-12-17 PROCEDURE — 99213 OFFICE O/P EST LOW 20 MIN: CPT

## 2025-06-11 ENCOUNTER — APPOINTMENT (OUTPATIENT)
Dept: ORTHOPEDIC SURGERY | Facility: CLINIC | Age: 85
End: 2025-06-11

## 2025-07-09 ENCOUNTER — APPOINTMENT (OUTPATIENT)
Dept: ORTHOPEDIC SURGERY | Facility: CLINIC | Age: 85
End: 2025-07-09

## 2025-08-11 ENCOUNTER — APPOINTMENT (OUTPATIENT)
Dept: ORTHOPEDIC SURGERY | Facility: CLINIC | Age: 85
End: 2025-08-11
Payer: MEDICARE

## 2025-08-11 DIAGNOSIS — M54.2 CERVICALGIA: ICD-10-CM

## 2025-08-11 DIAGNOSIS — M51.9 UNSPECIFIED THORACIC, THORACOLUMBAR AND LUMBOSACRAL INTERVERTEBRAL DISC DISORDER: ICD-10-CM

## 2025-08-11 DIAGNOSIS — M77.8 OTHER ENTHESOPATHIES, NOT ELSEWHERE CLASSIFIED: ICD-10-CM

## 2025-08-11 DIAGNOSIS — M17.11 UNILATERAL PRIMARY OSTEOARTHRITIS, RIGHT KNEE: ICD-10-CM

## 2025-08-11 DIAGNOSIS — S93.602A UNSPECIFIED SPRAIN OF LEFT FOOT, INITIAL ENCOUNTER: ICD-10-CM

## 2025-08-11 PROCEDURE — 99213 OFFICE O/P EST LOW 20 MIN: CPT

## 2025-08-11 PROCEDURE — G2211 COMPLEX E/M VISIT ADD ON: CPT
